# Patient Record
Sex: FEMALE | Race: BLACK OR AFRICAN AMERICAN | NOT HISPANIC OR LATINO | Employment: FULL TIME | URBAN - METROPOLITAN AREA
[De-identification: names, ages, dates, MRNs, and addresses within clinical notes are randomized per-mention and may not be internally consistent; named-entity substitution may affect disease eponyms.]

---

## 2017-12-04 ENCOUNTER — HOSPITAL ENCOUNTER (EMERGENCY)
Facility: OTHER | Age: 22
Discharge: HOME | End: 2017-12-04
Attending: EMERGENCY MEDICINE
Payer: MEDICAID

## 2017-12-04 VITALS
BODY MASS INDEX: 42.51 KG/M2 | TEMPERATURE: 99 F | OXYGEN SATURATION: 99 % | DIASTOLIC BLOOD PRESSURE: 60 MMHG | RESPIRATION RATE: 18 BRPM | HEIGHT: 62 IN | HEART RATE: 59 BPM | SYSTOLIC BLOOD PRESSURE: 108 MMHG | WEIGHT: 231 LBS

## 2017-12-04 DIAGNOSIS — R10.9 ABDOMINAL DISCOMFORT: Primary | ICD-10-CM

## 2017-12-04 DIAGNOSIS — R11.2 NON-INTRACTABLE VOMITING WITH NAUSEA, UNSPECIFIED VOMITING TYPE: ICD-10-CM

## 2017-12-04 LAB
B-HCG UR QL: NEGATIVE
BILIRUB UR QL STRIP: NEGATIVE
CLARITY UR: CLEAR
COLOR UR: YELLOW
CTP QC/QA: YES
GLUCOSE UR QL STRIP: NEGATIVE
HGB UR QL STRIP: NEGATIVE
KETONES UR QL STRIP: NEGATIVE
LEUKOCYTE ESTERASE UR QL STRIP: NEGATIVE
NITRITE UR QL STRIP: NEGATIVE
PH UR STRIP: 7 [PH] (ref 5–8)
PROT UR QL STRIP: NEGATIVE
SP GR UR STRIP: 1.01 (ref 1–1.03)
URN SPEC COLLECT METH UR: NORMAL
UROBILINOGEN UR STRIP-ACNC: NEGATIVE EU/DL

## 2017-12-04 PROCEDURE — 81003 URINALYSIS AUTO W/O SCOPE: CPT

## 2017-12-04 PROCEDURE — 99283 EMERGENCY DEPT VISIT LOW MDM: CPT | Mod: 25

## 2017-12-04 PROCEDURE — 25000003 PHARM REV CODE 250: Performed by: PHYSICIAN ASSISTANT

## 2017-12-04 PROCEDURE — 81025 URINE PREGNANCY TEST: CPT | Performed by: EMERGENCY MEDICINE

## 2017-12-04 RX ORDER — HYOSCYAMINE SULFATE 0.12 MG/1
0.12 TABLET SUBLINGUAL
Status: COMPLETED | OUTPATIENT
Start: 2017-12-04 | End: 2017-12-04

## 2017-12-04 RX ORDER — ONDANSETRON 4 MG/1
4 TABLET, FILM COATED ORAL EVERY 6 HOURS
Qty: 12 TABLET | Refills: 0 | Status: SHIPPED | OUTPATIENT
Start: 2017-12-04 | End: 2018-04-30

## 2017-12-04 RX ORDER — ONDANSETRON 8 MG/1
8 TABLET, ORALLY DISINTEGRATING ORAL
Status: COMPLETED | OUTPATIENT
Start: 2017-12-04 | End: 2017-12-04

## 2017-12-04 RX ORDER — DICYCLOMINE HYDROCHLORIDE 20 MG/1
20 TABLET ORAL 2 TIMES DAILY
Qty: 8 TABLET | Refills: 0 | Status: SHIPPED | OUTPATIENT
Start: 2017-12-04 | End: 2018-01-03

## 2017-12-04 RX ADMIN — ONDANSETRON 8 MG: 8 TABLET, ORALLY DISINTEGRATING ORAL at 02:12

## 2017-12-04 RX ADMIN — HYOSCYAMINE SULFATE 0.12 MG: 0.12 TABLET, ORALLY DISINTEGRATING ORAL at 02:12

## 2017-12-04 NOTE — ED TRIAGE NOTES
Pt presents with emesis, onset friday. Pt reports eating chinese food prior to emesis. Decreased appetite since Friday. pt reports typically throws up only in morning time.  + nausea, emesis this morning. Pt repots unable to keep down liquids or solids.  Pt denies diarrhea, last bowel movment today. + epigastric abdominal pain. + hx of GERD. Pt reports irregular periods due to birthcontrol implant, last period 11/25/17. Pt in NAD.

## 2017-12-04 NOTE — ED NOTES
NEURO: Pt AAO x 4. Behavior and speech appropriate to situation.   CARDIAC: Regular rhythm auscultated  RESPIRATORY: Respirations even and unlabored. Breath sounds clear to all lung fields.   MUSCULOSKELETAL: Active ROM noted to extremities  GASTRO: see hpi; + bowel sounds, epigastric abdominal tenderness

## 2017-12-04 NOTE — ED PROVIDER NOTES
Encounter Date: 12/4/2017       History     Chief Complaint   Patient presents with    Emesis     pt states thinks she ate something bad on friday - starting with abdominal pain and vomiting and has been vomiting ever since friday - has GERD and vomiting has made pain worse     Patient is a 22-year-old female with no significant past medical history who presents to the ED with emesis and abdominal pain. She states her pain began 3 days ago after eating Chinese food. She states she is unable to tolerate fluids or solids.  She also reports decreased appetite.  She denies diarrhea or blood in her stool.  She reports epigastric abdominal cramping. She states her last normal bowel movement was yesterday.  She denies fever or chills. She also reports a history of GERD and states this feels similar to that.  She states she is no longer taking a PPI. She denies any recent antibiotic use or travel.      The history is provided by the patient.     Review of patient's allergies indicates:  No Known Allergies  Past Medical History:   Diagnosis Date    Back pain     GERD (gastroesophageal reflux disease)     Obesity      Past Surgical History:   Procedure Laterality Date    LIVER BIOPSY       Family History   Problem Relation Age of Onset    Cancer Mother     Breast cancer Mother      Social History   Substance Use Topics    Smoking status: Current Every Day Smoker     Types: Cigars    Smokeless tobacco: Never Used      Comment: 1 black and mild daily     Alcohol use Yes      Comment: monthly     Review of Systems   Constitutional: Negative for chills and fever.   HENT: Negative for congestion and sore throat.    Eyes: Negative for pain.   Respiratory: Negative for shortness of breath.    Cardiovascular: Negative for chest pain.   Gastrointestinal: Positive for abdominal pain, nausea and vomiting. Negative for diarrhea.   Genitourinary: Negative for dysuria.   Musculoskeletal: Negative for arthralgias.   Skin: Negative  for rash.   Neurological: Negative for headaches.       Physical Exam     Initial Vitals [12/04/17 1405]   BP Pulse Resp Temp SpO2   124/74 85 18 97.9 °F (36.6 °C) 100 %      MAP       90.67         Physical Exam    Constitutional: Vital signs are normal. She is cooperative.   -American female in no acute distress.  She is nontoxic-appearing.   HENT:   Head: Normocephalic and atraumatic.   Moist mucous membranes   Eyes: Conjunctivae and EOM are normal. Pupils are equal, round, and reactive to light.   Neck: Normal range of motion. Neck supple.   Cardiovascular: Normal rate, regular rhythm and intact distal pulses.   Pulmonary/Chest: Breath sounds normal. She has no wheezes. She has no rhonchi. She has no rales.   Abdominal: Soft. Bowel sounds are normal.   Mild tenderness to palpation to epigastrium.  No rebound tenderness or guarding.  Negative Jennings sign.    Musculoskeletal: Normal range of motion. She exhibits no edema.   Neurological: She is alert and oriented to person, place, and time. She has normal strength. No cranial nerve deficit. GCS eye subscore is 4. GCS verbal subscore is 5. GCS motor subscore is 6.   Skin: Skin is warm and dry. Capillary refill takes less than 2 seconds. No rash noted.   Psychiatric: She has a normal mood and affect. Her behavior is normal.         ED Course   Procedures  Labs Reviewed   URINALYSIS   POCT URINE PREGNANCY             Medical Decision Making:   Initial Assessment:   Urgent evaluation of a 22 y.o. female with a medical history of GERD presenting to the emergency department complaining of nausea and vomiting and abdominal discomfort. Patient is afebrile, nontoxic appearing and hemodynamically stable.  ED Management:  The patient does not appear dehydrated, septic, toxic and I doubt this is bacterial in nature given that the patient has no bloody stools, recent antibiotics, foreign travel, raw foods. Vital signs are stable. The physical exam is not consistent with  acute abdomen, acute appendicitis or obstruction. Urinalysis revealed no ketones. Patient was given an antiemetic and passed an oral challenge without complications. Will send her home with Zofran and a short course of Bentyl. I have given specific return precautions. I have discussed the patient with the attending thoroughly and he/she agrees to the treatment and plan.   Other:   I have discussed this case with another health care provider.  This note was created using Dragon Medical Dictation. There may be typographical errors secondary to dictation.                    ED Course      Clinical Impression:     1. Abdominal discomfort    2. Non-intractable vomiting with nausea, unspecified vomiting type                               Richard Barillas PA-C  12/04/17 8227

## 2018-04-30 ENCOUNTER — HOSPITAL ENCOUNTER (EMERGENCY)
Facility: OTHER | Age: 23
Discharge: HOME | End: 2018-04-30
Attending: EMERGENCY MEDICINE
Payer: MEDICAID

## 2018-04-30 VITALS
TEMPERATURE: 99 F | OXYGEN SATURATION: 100 % | BODY MASS INDEX: 39.93 KG/M2 | DIASTOLIC BLOOD PRESSURE: 73 MMHG | HEART RATE: 86 BPM | SYSTOLIC BLOOD PRESSURE: 138 MMHG | HEIGHT: 62 IN | WEIGHT: 217 LBS | RESPIRATION RATE: 16 BRPM

## 2018-04-30 DIAGNOSIS — N30.00 ACUTE CYSTITIS WITHOUT HEMATURIA: Primary | ICD-10-CM

## 2018-04-30 LAB
B-HCG UR QL: NEGATIVE
BACTERIA #/AREA URNS HPF: ABNORMAL /HPF
BILIRUB UR QL STRIP: NEGATIVE
CLARITY UR: ABNORMAL
COLOR UR: ABNORMAL
CTP QC/QA: YES
GLUCOSE UR QL STRIP: NEGATIVE
HGB UR QL STRIP: ABNORMAL
HYALINE CASTS #/AREA URNS LPF: 0 /LPF
KETONES UR QL STRIP: NEGATIVE
LEUKOCYTE ESTERASE UR QL STRIP: ABNORMAL
MICROSCOPIC COMMENT: ABNORMAL
NITRITE UR QL STRIP: NEGATIVE
PH UR STRIP: 8.5 [PH] (ref 5–8)
PROT UR QL STRIP: ABNORMAL
RBC #/AREA URNS HPF: ABNORMAL /HPF (ref 0–4)
SP GR UR STRIP: 1.01 (ref 1–1.03)
SQUAMOUS #/AREA URNS HPF: 16 /HPF
URN SPEC COLLECT METH UR: ABNORMAL
UROBILINOGEN UR STRIP-ACNC: 1 EU/DL
WBC #/AREA URNS HPF: 7 /HPF (ref 0–5)
WBC CLUMPS URNS QL MICRO: ABNORMAL
YEAST URNS QL MICRO: ABNORMAL

## 2018-04-30 PROCEDURE — 99283 EMERGENCY DEPT VISIT LOW MDM: CPT | Mod: 25

## 2018-04-30 PROCEDURE — 81000 URINALYSIS NONAUTO W/SCOPE: CPT

## 2018-04-30 PROCEDURE — 81025 URINE PREGNANCY TEST: CPT | Performed by: EMERGENCY MEDICINE

## 2018-04-30 PROCEDURE — 25000003 PHARM REV CODE 250: Performed by: EMERGENCY MEDICINE

## 2018-04-30 RX ORDER — SULFAMETHOXAZOLE AND TRIMETHOPRIM 800; 160 MG/1; MG/1
1 TABLET ORAL 2 TIMES DAILY
Qty: 6 TABLET | Refills: 0 | Status: SHIPPED | OUTPATIENT
Start: 2018-04-30 | End: 2018-05-03

## 2018-04-30 RX ORDER — PHENAZOPYRIDINE HYDROCHLORIDE 200 MG/1
200 TABLET, FILM COATED ORAL
Qty: 6 TABLET | Refills: 0 | Status: SHIPPED | OUTPATIENT
Start: 2018-04-30 | End: 2018-05-03

## 2018-04-30 RX ORDER — PHENAZOPYRIDINE HYDROCHLORIDE 200 MG/1
200 TABLET, FILM COATED ORAL
Status: COMPLETED | OUTPATIENT
Start: 2018-04-30 | End: 2018-04-30

## 2018-04-30 RX ADMIN — PHENAZOPYRIDINE HYDROCHLORIDE 200 MG: 200 TABLET ORAL at 03:04

## 2018-04-30 NOTE — ED PROVIDER NOTES
Encounter Date: 4/30/2018    SCRIBE #1 NOTE: Kelsey GRUBBS, am scribing for, and in the presence of, Dr. Meeks.       History     Chief Complaint   Patient presents with    Abdominal Pain     lower abdominal pain x 2 days, worsening tonight at 2200, denies n/v     Time seen by provider: 2:49 AM    This is a 23 y.o. female who presents with complaint of abdominal pain that began one day ago. Patient reports lower abdominal pain and dysuria. She describes abdominal pain as sharp.She is currently on her menstrual period. She is currently taking medication for Bacterial vaginosis. She denies fever, chills, flank pain, hematuria, urinary urgency, urinary frequency, or vaginal discharge. She denies taking any OTC medications for symptoms prior to arrival. She reports no identifying, alleviating, or exasperating factors for symptoms.       The history is provided by the patient.     Review of patient's allergies indicates:  No Known Allergies  Past Medical History:   Diagnosis Date    Back pain     GERD (gastroesophageal reflux disease)     Obesity      Past Surgical History:   Procedure Laterality Date    LIVER BIOPSY       Family History   Problem Relation Age of Onset    Cancer Mother     Breast cancer Mother      Social History   Substance Use Topics    Smoking status: Current Every Day Smoker     Types: Cigars    Smokeless tobacco: Never Used      Comment: 1 black and mild daily     Alcohol use Yes      Comment: monthly     Review of Systems   Constitutional: Negative for chills and fever.   HENT: Negative for sore throat.    Respiratory: Negative for shortness of breath.    Cardiovascular: Negative for chest pain.   Gastrointestinal: Positive for abdominal pain. Negative for diarrhea, nausea and vomiting.   Genitourinary: Positive for dysuria. Negative for flank pain, frequency, hematuria, urgency and vaginal discharge.   Musculoskeletal: Negative for back pain.   Skin: Negative for rash.   Neurological:  Negative for weakness.   Hematological: Does not bruise/bleed easily.       Physical Exam     Initial Vitals [04/30/18 0244]   BP Pulse Resp Temp SpO2   123/81 92 16 98.9 °F (37.2 °C) 99 %      MAP       95         Physical Exam    Nursing note and vitals reviewed.  Constitutional: She appears well-developed and well-nourished. She is not diaphoretic. No distress.   HENT:   Head: Normocephalic and atraumatic.   Right Ear: External ear normal.   Left Ear: External ear normal.   Eyes: EOM are normal. Right eye exhibits no discharge. Left eye exhibits no discharge.   Neck: Normal range of motion.   Cardiovascular: Normal rate, regular rhythm and normal heart sounds. Exam reveals no gallop and no friction rub.    No murmur heard.  Pulmonary/Chest: Breath sounds normal. No respiratory distress. She has no wheezes. She has no rhonchi. She has no rales.   Abdominal: Soft. There is tenderness in the suprapubic area. There is no rebound, no guarding and no CVA tenderness.   Musculoskeletal: Normal range of motion. She exhibits no edema or tenderness.   Neurological: She is alert and oriented to person, place, and time.   Skin: Skin is warm and dry. No rash and no abscess noted. No erythema. No pallor.   Psychiatric: She has a normal mood and affect. Her behavior is normal. Judgment and thought content normal.         ED Course   Procedures  Labs Reviewed   URINALYSIS - Abnormal; Notable for the following:        Result Value    Appearance, UA Cloudy (*)     Protein, UA 1+ (*)     Occult Blood UA 3+ (*)     Leukocytes, UA 1+ (*)     All other components within normal limits   URINALYSIS MICROSCOPIC - Abnormal; Notable for the following:     WBC, UA 7 (*)     Bacteria, UA Few (*)     All other components within normal limits   POCT URINE PREGNANCY             Medical Decision Making:   Clinical Tests:   Lab Tests: Ordered and Reviewed    Additional MDM:   Comments: 23-year-old female presents complaining of over 24 hours of  suprapubic pain with low back pain tonight as well as dysuria.  Patient is currently being treated for bacterial vaginosis and is on her menstrual cycle.  On exam she had no CVA tenderness in the suprapubic tenderness to palpation.  Urine pregnancy test negative.  Urinalysis distant with hematuria, a few white blood cells and leukocytes.  Given her symptoms, she will be treated for acute cystitis with Bactrim ×3 days.  PCP follow-up for reevaluation after completion of antibiotics if symptoms persist..            Attending Attestation:           Physician Attestation for Scribe:  Physician Attestation Statement for Scribe #1: I, Dr. Meeks, reviewed documentation, as scribed by Kelsey Miguel in my presence, and it is both accurate and complete.                    Clinical Impression:     1. Acute cystitis without hematuria                                 Rosario Meeks MD  04/30/18 7486

## 2018-04-30 NOTE — ED TRIAGE NOTES
"Pt c/o " lower abdominal sharp pain since yesterday unrelieved by tylenol. " pt c/o dysuria and right lower back pain  but denies fever, chills, sob, cp, n/v. Pt is currently taking flagyl for recent bladder infection  "

## 2018-07-11 ENCOUNTER — HOSPITAL ENCOUNTER (EMERGENCY)
Facility: OTHER | Age: 23
Discharge: HOME | End: 2018-07-11
Attending: EMERGENCY MEDICINE
Payer: MEDICAID

## 2018-07-11 VITALS
WEIGHT: 199.75 LBS | HEIGHT: 62 IN | BODY MASS INDEX: 36.76 KG/M2 | DIASTOLIC BLOOD PRESSURE: 68 MMHG | OXYGEN SATURATION: 99 % | TEMPERATURE: 98 F | HEART RATE: 76 BPM | SYSTOLIC BLOOD PRESSURE: 118 MMHG | RESPIRATION RATE: 18 BRPM

## 2018-07-11 DIAGNOSIS — Z20.2 STD EXPOSURE: ICD-10-CM

## 2018-07-11 DIAGNOSIS — N76.0 BV (BACTERIAL VAGINOSIS): Primary | ICD-10-CM

## 2018-07-11 DIAGNOSIS — B96.89 BV (BACTERIAL VAGINOSIS): Primary | ICD-10-CM

## 2018-07-11 LAB
B-HCG UR QL: NEGATIVE
BACTERIA GENITAL QL WET PREP: ABNORMAL
BILIRUB UR QL STRIP: NEGATIVE
CLARITY UR: CLEAR
CLUE CELLS VAG QL WET PREP: ABNORMAL
COLOR UR: YELLOW
CTP QC/QA: YES
FILAMENT FUNGI VAG WET PREP-#/AREA: ABNORMAL
GLUCOSE UR QL STRIP: NEGATIVE
HGB UR QL STRIP: NEGATIVE
KETONES UR QL STRIP: NEGATIVE
LEUKOCYTE ESTERASE UR QL STRIP: NEGATIVE
NITRITE UR QL STRIP: NEGATIVE
PH UR STRIP: 7 [PH] (ref 5–8)
PROT UR QL STRIP: NEGATIVE
SP GR UR STRIP: 1.01 (ref 1–1.03)
SPECIMEN SOURCE: ABNORMAL
T VAGINALIS GENITAL QL WET PREP: ABNORMAL
URN SPEC COLLECT METH UR: NORMAL
UROBILINOGEN UR STRIP-ACNC: NEGATIVE EU/DL
WBC #/AREA VAG WET PREP: ABNORMAL
YEAST GENITAL QL WET PREP: ABNORMAL

## 2018-07-11 PROCEDURE — 81003 URINALYSIS AUTO W/O SCOPE: CPT

## 2018-07-11 PROCEDURE — 99283 EMERGENCY DEPT VISIT LOW MDM: CPT | Mod: 25

## 2018-07-11 PROCEDURE — 96372 THER/PROPH/DIAG INJ SC/IM: CPT

## 2018-07-11 PROCEDURE — 81025 URINE PREGNANCY TEST: CPT | Performed by: EMERGENCY MEDICINE

## 2018-07-11 PROCEDURE — 63600175 PHARM REV CODE 636 W HCPCS: Performed by: PHYSICIAN ASSISTANT

## 2018-07-11 PROCEDURE — 25000003 PHARM REV CODE 250: Performed by: PHYSICIAN ASSISTANT

## 2018-07-11 PROCEDURE — 87210 SMEAR WET MOUNT SALINE/INK: CPT

## 2018-07-11 PROCEDURE — 87491 CHLMYD TRACH DNA AMP PROBE: CPT

## 2018-07-11 RX ORDER — METRONIDAZOLE 500 MG/1
500 TABLET ORAL EVERY 12 HOURS
Qty: 14 TABLET | Refills: 0 | Status: SHIPPED | OUTPATIENT
Start: 2018-07-11 | End: 2018-07-18

## 2018-07-11 RX ORDER — CEFTRIAXONE 250 MG/1
250 INJECTION, POWDER, FOR SOLUTION INTRAMUSCULAR; INTRAVENOUS
Status: COMPLETED | OUTPATIENT
Start: 2018-07-11 | End: 2018-07-11

## 2018-07-11 RX ORDER — AZITHROMYCIN 250 MG/1
1000 TABLET, FILM COATED ORAL
Status: COMPLETED | OUTPATIENT
Start: 2018-07-11 | End: 2018-07-11

## 2018-07-11 RX ADMIN — AZITHROMYCIN 1000 MG: 250 TABLET, FILM COATED ORAL at 02:07

## 2018-07-11 RX ADMIN — CEFTRIAXONE SODIUM 250 MG: 250 INJECTION, POWDER, FOR SOLUTION INTRAMUSCULAR; INTRAVENOUS at 02:07

## 2018-07-11 NOTE — ED PROVIDER NOTES
Encounter Date: 7/11/2018       History     Chief Complaint   Patient presents with    Exposure to STD     states she has had discharge x 1 wk. reports she has fishy smell and gonorrhea     Patient is 23 year old female who presents with complaints of vaginal discharge and fishy vaginal odor as in present for 1 week.  She reports she was recently diagnosed and treated for gonorrhea.  She admits her symptoms resolved but she continued to have a sex with her boyfriend who was not tested or treated.  She reports her symptoms have now returned.  She reports no nausea, vomiting, abdominal pain, irregular bleeding, dysuria, hematuria or abnormal bowel movements.  She is currently alone in exam room.           Review of patient's allergies indicates:  No Known Allergies  Past Medical History:   Diagnosis Date    Back pain     GERD (gastroesophageal reflux disease)     Obesity      Past Surgical History:   Procedure Laterality Date    LIVER BIOPSY       Family History   Problem Relation Age of Onset    Cancer Mother     Breast cancer Mother      Social History   Substance Use Topics    Smoking status: Current Every Day Smoker     Types: Cigars    Smokeless tobacco: Never Used      Comment: 1 black and mild daily     Alcohol use Yes      Comment: monthly     Review of Systems   Constitutional: Negative for fever.   HENT: Negative for sore throat.    Respiratory: Negative for shortness of breath.    Cardiovascular: Negative for chest pain.   Gastrointestinal: Negative for nausea.   Genitourinary: Positive for vaginal discharge. Negative for dysuria.   Musculoskeletal: Negative for back pain.   Skin: Negative for rash.   Neurological: Negative for weakness.   Hematological: Does not bruise/bleed easily.       Physical Exam     Initial Vitals [07/11/18 1319]   BP Pulse Resp Temp SpO2   102/61 62 18 98 °F (36.7 °C) 100 %      MAP       --         Physical Exam    Nursing note and vitals reviewed.  Constitutional: She  appears well-developed and well-nourished. She is not diaphoretic. No distress.   Healthy appearing female in NAD or apparent pain. She makes good eye contact, speaks in clear full sentences and ambulates with ease.    HENT:   Head: Normocephalic and atraumatic.   Eyes: Conjunctivae and EOM are normal. Pupils are equal, round, and reactive to light. Right eye exhibits no discharge. Left eye exhibits no discharge. No scleral icterus.   Neck: Normal range of motion.   Cardiovascular: Normal rate, regular rhythm, normal heart sounds and intact distal pulses. Exam reveals no gallop and no friction rub.    No murmur heard.  Pulmonary/Chest: Breath sounds normal. She has no wheezes. She has no rhonchi. She has no rales.   Abdominal: Soft. Bowel sounds are normal. There is no tenderness. There is no rebound and no guarding.   Genitourinary:   Genitourinary Comments: Thin whit discharge in vault without bleeding. There is no cervical abnormalities. There is no CMT, uterine or adnexal TTP bimanually.    Musculoskeletal: Normal range of motion. She exhibits no edema or tenderness.   Lymphadenopathy:     She has no cervical adenopathy.   Neurological: She is alert and oriented to person, place, and time. She has normal strength. No cranial nerve deficit or sensory deficit.   Skin: Skin is warm. Capillary refill takes less than 2 seconds. No rash and no abscess noted. No erythema.   Psychiatric: She has a normal mood and affect. Thought content normal.         ED Course   Procedures  Labs Reviewed   C. TRACHOMATIS/N. GONORRHOEAE BY AMP DNA   URINALYSIS   VAGINAL SCREEN   POCT URINE PREGNANCY         Labs Reviewed   VAGINAL SCREEN - Abnormal; Notable for the following:        Result Value    Clue Cells, Wet Prep Rare (*)     Bacteria - Vaginal Screen Moderate (*)     All other components within normal limits   C. TRACHOMATIS/N. GONORRHOEAE BY AMP DNA   URINALYSIS   POCT URINE PREGNANCY            Medical Decision Making:   ED  Management:  Urgent evaluation of 23 year old female who presents with complaints of vaginal discharge found to have BV. She is afebrile, non-toxic appearing and hemodynamically stable. Physical exam outlined above and reveals evidence of bacterial vaginosis on vaginal screen.  No clinical evidence of significant gonorrhea or chlamydial infection however given her history will treat empirically according to patient's request.  Will also cover with Flagyl and encourage follow-up with OBGYN for test of cure.  She is adamantly instructed to stop having sex until her symptoms have resolved, she has been tested for cure, and her partner has been tested as well. She verbalized understanding and is amenable to plan. Case discussed with attending MD who agrees with plan.   Other:   I have discussed this case with another health care provider.                      Clinical Impression:   The primary encounter diagnosis was BV (bacterial vaginosis). A diagnosis of STD exposure was also pertinent to this visit.                             Faby Miguel PA-C  07/11/18 2316

## 2018-07-12 LAB
C TRACH DNA SPEC QL NAA+PROBE: NOT DETECTED
N GONORRHOEA DNA SPEC QL NAA+PROBE: NOT DETECTED

## 2019-11-20 ENCOUNTER — HOSPITAL ENCOUNTER (EMERGENCY)
Facility: HOSPITAL | Age: 24
Discharge: HOME | End: 2019-11-20
Attending: EMERGENCY MEDICINE
Payer: MEDICAID

## 2019-11-20 VITALS
HEART RATE: 57 BPM | BODY MASS INDEX: 34.39 KG/M2 | TEMPERATURE: 98 F | HEIGHT: 66 IN | DIASTOLIC BLOOD PRESSURE: 67 MMHG | WEIGHT: 214 LBS | OXYGEN SATURATION: 100 % | RESPIRATION RATE: 16 BRPM | SYSTOLIC BLOOD PRESSURE: 119 MMHG

## 2019-11-20 DIAGNOSIS — R53.1 WEAKNESS: ICD-10-CM

## 2019-11-20 DIAGNOSIS — F32.A DEPRESSION, UNSPECIFIED DEPRESSION TYPE: Primary | ICD-10-CM

## 2019-11-20 PROBLEM — F12.10 CANNABIS ABUSE, DAILY USE: Status: ACTIVE | Noted: 2019-11-20

## 2019-11-20 PROBLEM — F33.2 SEVERE EPISODE OF RECURRENT MAJOR DEPRESSIVE DISORDER, WITHOUT PSYCHOTIC FEATURES: Status: ACTIVE | Noted: 2019-11-20

## 2019-11-20 LAB
ALBUMIN SERPL BCP-MCNC: 4.2 G/DL (ref 3.5–5.2)
ALP SERPL-CCNC: 48 U/L (ref 55–135)
ALT SERPL W/O P-5'-P-CCNC: 11 U/L (ref 10–44)
AMPHET+METHAMPHET UR QL: NEGATIVE
ANION GAP SERPL CALC-SCNC: 10 MMOL/L (ref 8–16)
APAP SERPL-MCNC: <3 UG/ML (ref 10–20)
AST SERPL-CCNC: 16 U/L (ref 10–40)
B-HCG UR QL: NEGATIVE
BACTERIA #/AREA URNS AUTO: ABNORMAL /HPF
BARBITURATES UR QL SCN>200 NG/ML: NEGATIVE
BASOPHILS # BLD AUTO: 0.01 K/UL (ref 0–0.2)
BASOPHILS NFR BLD: 0.3 % (ref 0–1.9)
BENZODIAZ UR QL SCN>200 NG/ML: NEGATIVE
BILIRUB SERPL-MCNC: 0.7 MG/DL (ref 0.1–1)
BILIRUB UR QL STRIP: NEGATIVE
BUN SERPL-MCNC: 7 MG/DL (ref 6–20)
BZE UR QL SCN: NEGATIVE
CALCIUM SERPL-MCNC: 9.1 MG/DL (ref 8.7–10.5)
CANNABINOIDS UR QL SCN: NORMAL
CHLORIDE SERPL-SCNC: 108 MMOL/L (ref 95–110)
CLARITY UR REFRACT.AUTO: ABNORMAL
CO2 SERPL-SCNC: 22 MMOL/L (ref 23–29)
COLOR UR AUTO: YELLOW
CREAT SERPL-MCNC: 0.8 MG/DL (ref 0.5–1.4)
CREAT UR-MCNC: 296 MG/DL (ref 15–325)
CTP QC/QA: YES
CTP QC/QA: YES
DIFFERENTIAL METHOD: ABNORMAL
EOSINOPHIL # BLD AUTO: 0 K/UL (ref 0–0.5)
EOSINOPHIL NFR BLD: 0.8 % (ref 0–8)
ERYTHROCYTE [DISTWIDTH] IN BLOOD BY AUTOMATED COUNT: 12.1 % (ref 11.5–14.5)
EST. GFR  (AFRICAN AMERICAN): >60 ML/MIN/1.73 M^2
EST. GFR  (NON AFRICAN AMERICAN): >60 ML/MIN/1.73 M^2
ETHANOL SERPL-MCNC: <10 MG/DL
GLUCOSE SERPL-MCNC: 76 MG/DL (ref 70–110)
GLUCOSE UR QL STRIP: NEGATIVE
HCT VFR BLD AUTO: 41.7 % (ref 37–48.5)
HGB BLD-MCNC: 13.7 G/DL (ref 12–16)
HGB UR QL STRIP: NEGATIVE
IMM GRANULOCYTES # BLD AUTO: 0.01 K/UL (ref 0–0.04)
IMM GRANULOCYTES NFR BLD AUTO: 0.3 % (ref 0–0.5)
KETONES UR QL STRIP: ABNORMAL
LEUKOCYTE ESTERASE UR QL STRIP: NEGATIVE
LYMPHOCYTES # BLD AUTO: 2.1 K/UL (ref 1–4.8)
LYMPHOCYTES NFR BLD: 54.1 % (ref 18–48)
MCH RBC QN AUTO: 30.1 PG (ref 27–31)
MCHC RBC AUTO-ENTMCNC: 32.9 G/DL (ref 32–36)
MCV RBC AUTO: 92 FL (ref 82–98)
METHADONE UR QL SCN>300 NG/ML: NEGATIVE
MICROSCOPIC COMMENT: ABNORMAL
MONOCYTES # BLD AUTO: 0.4 K/UL (ref 0.3–1)
MONOCYTES NFR BLD: 10.2 % (ref 4–15)
NEUTROPHILS # BLD AUTO: 1.4 K/UL (ref 1.8–7.7)
NEUTROPHILS NFR BLD: 34.3 % (ref 38–73)
NITRITE UR QL STRIP: NEGATIVE
NRBC BLD-RTO: 0 /100 WBC
OPIATES UR QL SCN: NEGATIVE
PCP UR QL SCN>25 NG/ML: NEGATIVE
PH UR STRIP: 6 [PH] (ref 5–8)
PLATELET # BLD AUTO: 174 K/UL (ref 150–350)
PMV BLD AUTO: 9.6 FL (ref 9.2–12.9)
POC MOLECULAR INFLUENZA A AGN: NEGATIVE
POC MOLECULAR INFLUENZA B AGN: NEGATIVE
POTASSIUM SERPL-SCNC: 4 MMOL/L (ref 3.5–5.1)
PROT SERPL-MCNC: 7.2 G/DL (ref 6–8.4)
PROT UR QL STRIP: NEGATIVE
RBC # BLD AUTO: 4.55 M/UL (ref 4–5.4)
RBC #/AREA URNS AUTO: 5 /HPF (ref 0–4)
SODIUM SERPL-SCNC: 140 MMOL/L (ref 136–145)
SP GR UR STRIP: 1.02 (ref 1–1.03)
SQUAMOUS #/AREA URNS AUTO: 21 /HPF
TOXICOLOGY INFORMATION: NORMAL
TSH SERPL DL<=0.005 MIU/L-ACNC: 0.43 UIU/ML (ref 0.4–4)
URN SPEC COLLECT METH UR: ABNORMAL
WBC # BLD AUTO: 3.94 K/UL (ref 3.9–12.7)
WBC #/AREA URNS AUTO: 3 /HPF (ref 0–5)

## 2019-11-20 PROCEDURE — 99285 EMERGENCY DEPT VISIT HI MDM: CPT | Mod: ,,, | Performed by: EMERGENCY MEDICINE

## 2019-11-20 PROCEDURE — 63600175 PHARM REV CODE 636 W HCPCS: Performed by: EMERGENCY MEDICINE

## 2019-11-20 PROCEDURE — 80307 DRUG TEST PRSMV CHEM ANLYZR: CPT

## 2019-11-20 PROCEDURE — 80053 COMPREHEN METABOLIC PANEL: CPT

## 2019-11-20 PROCEDURE — 93005 ELECTROCARDIOGRAM TRACING: CPT

## 2019-11-20 PROCEDURE — 87502 INFLUENZA DNA AMP PROBE: CPT

## 2019-11-20 PROCEDURE — 99285 PR EMERGENCY DEPT VISIT,LEVEL V: ICD-10-PCS | Mod: ,,, | Performed by: EMERGENCY MEDICINE

## 2019-11-20 PROCEDURE — 85025 COMPLETE CBC W/AUTO DIFF WBC: CPT

## 2019-11-20 PROCEDURE — 93010 EKG 12-LEAD: ICD-10-PCS | Mod: ,,, | Performed by: INTERNAL MEDICINE

## 2019-11-20 PROCEDURE — 80320 DRUG SCREEN QUANTALCOHOLS: CPT

## 2019-11-20 PROCEDURE — 81001 URINALYSIS AUTO W/SCOPE: CPT

## 2019-11-20 PROCEDURE — 84443 ASSAY THYROID STIM HORMONE: CPT

## 2019-11-20 PROCEDURE — 80329 ANALGESICS NON-OPIOID 1 OR 2: CPT

## 2019-11-20 PROCEDURE — 96361 HYDRATE IV INFUSION ADD-ON: CPT

## 2019-11-20 PROCEDURE — 81025 URINE PREGNANCY TEST: CPT | Performed by: EMERGENCY MEDICINE

## 2019-11-20 PROCEDURE — 99284 EMERGENCY DEPT VISIT MOD MDM: CPT | Mod: 25

## 2019-11-20 PROCEDURE — 93010 ELECTROCARDIOGRAM REPORT: CPT | Mod: ,,, | Performed by: INTERNAL MEDICINE

## 2019-11-20 PROCEDURE — 96374 THER/PROPH/DIAG INJ IV PUSH: CPT

## 2019-11-20 RX ORDER — ESCITALOPRAM OXALATE 10 MG/1
10 TABLET ORAL DAILY
Qty: 30 TABLET | Refills: 0 | Status: SHIPPED | OUTPATIENT
Start: 2019-11-20 | End: 2020-11-19

## 2019-11-20 RX ORDER — ONDANSETRON 2 MG/ML
4 INJECTION INTRAMUSCULAR; INTRAVENOUS
Status: COMPLETED | OUTPATIENT
Start: 2019-11-20 | End: 2019-11-20

## 2019-11-20 RX ADMIN — ONDANSETRON 4 MG: 2 INJECTION INTRAMUSCULAR; INTRAVENOUS at 11:11

## 2019-11-20 RX ADMIN — SODIUM CHLORIDE 1000 ML: 0.9 INJECTION, SOLUTION INTRAVENOUS at 11:11

## 2019-11-20 NOTE — ASSESSMENT & PLAN NOTE
A:  Dx as above, vs SIMD. Patient with alarming reports of recent hopelessness and passive SI, but denies any occurrence of active SI or organized plan. No current SI. Patient with no signs or symptoms of thought disorder. Occasional alcohol use in social setting and no guns at home. Collateral very supportive of discharge. Discussed possibility of inpatient treatment with patient, who ultimately felt that an IOP and/or outpatient psychiatric management would be more appropriate.     P:  - Patient does not meet PEC criteria at this time.  - discharge to care of cousin/father  - Patient believes she has medical insurance through [facility]  - recommend alternate SSRI to celexa if restarted (consider lexapro 5 mg po x3 days then 10 mg PO daily afterwards) due to reported abdo distress as side effect. Recommend avoid zoloft due to likelihood for abdominal distress.  - recommend ambulatory referral to [facility] outpatient psychiatry (and [facility] if possible)  - psych MD placed resources in AVS, including [facility] and [facility] outpatient psych phone number at top of list (these were discussed with patient)  - patient advised to refrain from drugs/alcohol, notably daily THC  - Patient and collateral advised to return to ER or call 911 should patient or collateral believe patient is at danger of harming self or others.

## 2019-11-20 NOTE — ED PROVIDER NOTES
"Encounter Date: 11/20/2019    SCRIBE #1 NOTE: I, Gisela Mcdonald, am scribing for, and in the presence of,  Dr. Lujan. I have scribed the entire note.       History     Chief Complaint   Patient presents with    URI     Pt reports her child has been sick and she has had a cough for a couple days. Also complaining of nausea. Denies vomiting. Pt also reports diahhrea last week.      Time patient was seen by the provider: 9:56 AM      The patient is a 24 y.o. female with co-morbidities including: GERD, anxiety, post partum depression, who presents to the ED with a complaint of URI symptoms for 5 days. The patient reports her child has been sick since last week with a fever and infection. She notes of associated symptoms of cough, headaches, fever, chills and also nausea. She states she has been feeling very nauseas lately but has been unable to vomit. Patient mentions she has been very depressed lately due to many personal problems. This is around the time of her mother's anniversary passing away 6 years ago. She also broke up with her child's father. She admits vague thoughts of harming herself at times. She states, "Sometimes I feel I just want to lay down to die". She has not been eating or drinking much lately. She states she has a history of post partum depression. Denies having any active suicidal thoughts at this time.     The history is provided by the patient and medical records.     Review of patient's allergies indicates:  No Known Allergies  Past Medical History:   Diagnosis Date    Back pain     GERD (gastroesophageal reflux disease)     Obesity      Past Surgical History:   Procedure Laterality Date    LIVER BIOPSY       Family History   Problem Relation Age of Onset    Cancer Mother     Breast cancer Mother      Social History     Tobacco Use    Smoking status: Current Every Day Smoker     Types: Cigars    Smokeless tobacco: Never Used    Tobacco comment: 1 black and mild daily    Substance " Use Topics    Alcohol use: Yes     Comment: monthly    Drug use: Yes     Types: Marijuana     Comment: once monthly      Review of Systems   Constitutional: Positive for appetite change (decreased), chills and fever.   HENT: Negative for sore throat.    Respiratory: Positive for cough. Negative for shortness of breath.    Cardiovascular: Negative for chest pain.   Gastrointestinal: Positive for nausea. Negative for vomiting.   Genitourinary: Negative for dysuria.   Musculoskeletal: Negative for back pain.   Skin: Negative for rash.   Neurological: Positive for headaches. Negative for weakness.   Hematological: Does not bruise/bleed easily.       Physical Exam     Initial Vitals [11/20/19 0923]   BP Pulse Resp Temp SpO2   123/64 68 18 97.7 °F (36.5 °C) 99 %      MAP       --         Physical Exam    Nursing note and vitals reviewed.  Constitutional: She appears well-developed and well-nourished. She is not diaphoretic. No distress.   Appears dehydrated   HENT:   Head: Normocephalic and atraumatic.   Mouth/Throat: Oropharynx is clear and moist.   Eyes: Conjunctivae and EOM are normal. Pupils are equal, round, and reactive to light.   Neck: Normal range of motion. Neck supple. No JVD present.   Cardiovascular: Normal rate, regular rhythm and normal heart sounds.   No murmur heard.  Pulmonary/Chest: Breath sounds normal. No respiratory distress. She has no wheezes. She has no rhonchi. She has no rales.   Abdominal: Soft. Bowel sounds are normal. She exhibits no distension and no mass. There is no tenderness. There is no rebound and no guarding.   Musculoskeletal: Normal range of motion. She exhibits no edema or tenderness.   Neurological: She is alert and oriented to person, place, and time. She has normal strength. No cranial nerve deficit or sensory deficit.   Skin: Skin is warm and dry. No rash noted.   Psychiatric: Thought content normal.   Depressed affect         ED Course   Procedures  Labs Reviewed   CBC W/  AUTO DIFFERENTIAL - Abnormal; Notable for the following components:       Result Value    Gran # (ANC) 1.4 (*)     Gran% 34.3 (*)     Lymph% 54.1 (*)     All other components within normal limits   COMPREHENSIVE METABOLIC PANEL - Abnormal; Notable for the following components:    CO2 22 (*)     Alkaline Phosphatase 48 (*)     All other components within normal limits   URINALYSIS, REFLEX TO URINE CULTURE - Abnormal; Notable for the following components:    Appearance, UA Cloudy (*)     Ketones, UA 3+ (*)     All other components within normal limits    Narrative:     Preferred Collection Type->Urine, Clean Catch   ACETAMINOPHEN LEVEL - Abnormal; Notable for the following components:    Acetaminophen (Tylenol), Serum <3.0 (*)     All other components within normal limits   URINALYSIS MICROSCOPIC - Abnormal; Notable for the following components:    RBC, UA 5 (*)     All other components within normal limits    Narrative:     Preferred Collection Type->Urine, Clean Catch   TSH   DRUG SCREEN PANEL, URINE EMERGENCY    Narrative:     Preferred Collection Type->Urine, Clean Catch   ALCOHOL,MEDICAL (ETHANOL)   POCT INFLUENZA A/B MOLECULAR   POCT URINE PREGNANCY     EKG Readings: (Independently Interpreted)   Initial Reading: No STEMI. Rhythm: Normal Sinus Rhythm. Heart Rate: 65.   No ischemic changes.  QTC at 424     ECG Results          EKG 12-lead (Final result)  Result time 11/20/19 11:29:40    Final result by Interface, Lab In [facility] (11/20/19 11:29:40)                 Narrative:    Test Reason : R53.1,    Vent. Rate : 065 BPM     Atrial Rate : 065 BPM     P-R Int : 140 ms          QRS Dur : 088 ms      QT Int : 408 ms       P-R-T Axes : 059 061 060 degrees     QTc Int : 424 ms    Normal sinus rhythm with sinus arrhythmia  Normal ECG  No previous ECGs available  Confirmed by DARRION BATISTA MD ([id number]) on 11/20/2019 11:29:31 AM    Referred By: [email or web address]   SELF           Confirmed By:DARRION BATISTA MD                             Imaging Results    None          Medical Decision Making:   History:   Old Medical Records: I decided to obtain old medical records.  Initial Assessment:   Patient with viral symptoms. Will do medical evaluation. She does appear a little bit dehydrated. Will give IV fluids. Patient with depression. Vague suicidal ideations, no active suicidal thoughts. Discussed case with psychiatry and felt that patient does not need to be PEC and feels he should come down for evaluation. Will keep her here voluntarily. I discussed this with charge nurse and nursing.   Independently Interpreted Test(s):   I have ordered and independently interpreted EKG Reading(s) - see prior notes  Clinical Tests:   Lab Tests: Ordered and Reviewed  Medical Tests: Ordered and Reviewed  ED Management:  1:29 PM  Seen by psychiatry, recommend discharge and follow up with psychiatry here.     1:32 PM  Went over discharge with her. Her father is here and is comfortable of her being released.   Other:   I have discussed this case with another health care provider.       <> Summary of the Discussion: Psychiatry            Scribe Attestation:   Scribe #1: I performed the above scribed service and the documentation accurately describes the services I performed. I attest to the accuracy of the note.                          Clinical Impression:       ICD-10-CM ICD-9-CM   1. Depression, unspecified depression type F32.9 311   2. Weakness R53.1 780.79         Disposition:   Disposition: Discharged  Condition: Stable                     León Lujan MD  11/20/19 1919

## 2019-11-20 NOTE — ED TRIAGE NOTES
Patient reports she has been having a URI, she is depressed for the last 3 weeks.  Patient has no plane but does wish she could go to sleep and not wake up.

## 2019-11-20 NOTE — DISCHARGE INSTRUCTIONS
BMU program (Intensive Outpatient Program) at [facility] - this program was discussed with patient who voiced understanding , advised would be in discharge paperwork  To enroll in the Behavioral Medicine Program, call [phone] or call the Department of Psychiatry at [phone] or toll-free at [phone] and ask for the Behavioral Medicine Unit.    - [facility]: Ab House 4th floor, [phone]  ============================    [place] Supported Outpatient Clinics  - [facility]: [address] [facility], [phone]  - [place] [facility]: [address] [facility], [phone]  - [facility]: [address], [facility], [phone]  - [facility]: [address] [facility], [phone]  - [facility]): [address], [phone]  - [facility] ([facility] [address] [facility] Danika [place]ice[place], [phone]  - Salma Avila [facility]): [phone]  - [facility] [phone]     U and Private Outpatient Clinics    - [facility]: Ab House 4th floor, [phone]  - [facility]: [address] [facility] [address] 3rd Floor)[facility], [phone]  - [facility]: [address] [facility], [phone], [phone]  - [facility], [facility]: [address], [phone]    Outpatient Group Therapy  - Cancer Support Group: [facility], [address] [facility], Stefan Malone, [phone]  - Depression/Bipolar Support [place]: [facility], [address], 7:30pm 1st & 3rd Tuesdays in cafeteria, [phone]  - Heart Transplant Support Group: [facility], 3rd Wednesday, 7th floor conference room, Kellee Robles, [phone]  - [facility] ([facility]): [address] [place] [address], [place], [phone]  - [facility]: [facility] 458,  Katrin Mcgrath, [phone]    Outpatient Drug Rehab   - [facility]: Ab House, 4th Floor, Augusto Shweta, [phone]  - Alcoholics Anonymous: [facility].org, 24 Hr Hotline:238.558.9976, Main: [phone]  - [place] [facility]: [address] [facility], [phone],  at 10am, Reece Buchanan (ext. [phone])  - [facility]: [address], [phone]  - [place] [facility]: [address] [facility] Danika Lice[address], [phone]  - [facility] [facility]: [address], [phone]    Inpatient Drug Rehab  - [facility]: [address] [facility], [phone]   - [facility]: [address] [facility], [phone]   - [facility]: [address], [place], [phone]  - [facility]: [phone]  - [facility] <females only> ([employer] Agency): [address] [facility], [phone], ext [phone], Stefanie Lucho  - [facility] <Fee based>: [address], [facility], [phone]    Elderly Services:  - Adult Protective Services: [phone]  - Bereavement Support Group, [facility],  &  Tues, [address] [facility], [phone],  Vitaliy Beck, Ms Federico, therapists  - Case Management for [facility]- [address] [facility], [place] [address] - Call [phone]   - [facility] on [address], [phone]  - [facility] -. [address] [place], [place] [address] [phone] ext [phone]   - [facility]:. - [address] [facility], [address], [place] [address] - Call [phone]   - [facility] on Aging: Information on Aging Services - [address] [facility], [address], [place] [address] - call [phone]     Alzheimers Services:   - [facility]'s [facility] : - [facility] [place], [place] [address] - Call [phone]   - Alzheimer's Adult care at the [facility] and  [place], [place]. -  call [phone].  - Alzheimer's Services [facility]: Provides current information on services available. Call [phone]     Mental Retardation Services:   - [facility] Retarded [facility]- Call [phone]     Female Services:  - Battered Womens Hotline: [phone],   - [facility]attered [facility]om[facility]: [address] [address], [place], [address], Tele: [phone]  - Rape Hotline: 1-289.731.4097,     Child Outpatient Psychiatry  - [facility]: [address] [place] [address], [address], [phone]  - [facility]: [address], [phone], [phone]    Placement/Shelters:    - [facility] Services: - [address] [place], [place] [address] - Call [phone]   - [facility]  Medicaid funded, call [phone]   - [facility]: family transitional housing, [address] [facility], call for intake appt, [phone],   - [facility], Outreach and Housing Placement, [address] [place] [address],  Sister Radha Rico MSW, [phone]  - [facility]: [address] [facility], [phone]  - [facility] (16-24 year old): [address], [phone]  - [facility]: [address] [facility]  [phone]  - [facility], [address] [facility] [phone]  - [facility] (women only): [address], [phone]    HIV/AIDS Placement:  - AIDS Housing / Shelter: [address], [address], [place], [place] [address] - call: [phone] or [phone]  - [facility], Call [phone]   - [facility] [phone]    Health Clinics / Dental Clinics / Indigent Pharmacy  - [facility]: [address] [facility] [address], [phone]  - [facility]: [address] [facility], [phone], Mon-Thur 9am-4pm, Fri 9am-12pm  - [facility]: [address] [facility], [phone]  - [facility]: [address] [facility] [facility] [address],  [phone], fax [phone]  - [facility] (dental): [address] [place] [address]  [phone]  - [facility] Office of [facility], [address] [facility], [phone]  - [facility] (dental): [address], [place], [phone]  - [facility] (Pharmacy): [address], ([facility]), [phone], Mon/Wed 8am-1pm    s Offices:  - [facility] s Office: Dr. Barron, [phone], [phone]  - [facility] s Office: Dr Lester Morrison: [phone]    Crisis  - [facility] Center Hotline, [phone]  - National Suicide Prevention Crisis Hotline: 881.206.6440
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
## 2019-11-20 NOTE — SUBJECTIVE & OBJECTIVE
Patient History           Medical as of 11/20/2019     Past Medical History     Diagnosis Date Comments Source    Back pain -- -- Provider    GERD (gastroesophageal reflux disease) -- -- Provider    Obesity -- -- Provider                  Surgical as of 11/20/2019     Past Surgical History     Procedure Laterality Date Comments Source    LIVER BIOPSY -- -- -- Provider                  Family as of 11/20/2019     Problem Relation Name Age of Onset Comments Source    Cancer Mother -- -- -- Provider    Breast cancer Mother -- -- -- Provider            Tobacco Use as of 11/20/2019     Smoking Status Smoking Start Date Smoking Quit Date Packs/Day Years Used    Current Every Day Smoker -- -- -- --    Types Comments Smokeless Tobacco Status Smokeless Tobacco Quit Date Source     Cigars 1 black and mild daily  Never Used -- Provider            Alcohol Use as of 11/20/2019     Alcohol Use Drinks/Week Alcohol/Week Comments Source    Yes -- -- monthly Provider    Frequency Standard Drinks Binge Drinking        -- -- --              Drug Use as of 11/20/2019     Drug Use Types Frequency Comments Source    Yes  Marijuana -- once monthly  Provider            Sexual Activity as of 11/20/2019     Sexually Active Birth Control Partners Comments Source    -- -- -- -- Provider            Activities of Daily Living as of 11/20/2019    None           Social Documentation as of 11/20/2019    None           Occupational as of 11/20/2019    None           Socioeconomic as of 11/20/2019     Marital Status Spouse Name Number of Children Years Education Education Level Preferred Language Ethnicity Race Source    Single -- -- -- -- English /Black Black or  --    Financial Resource Strain Food Insecurity: Worry Food Insecurity: Inability Transportation Needs: Medical Transportation Needs: Non-medical    -- -- -- -- --            Pertinent History     Question Response Comments    Lives with -- --    Place in  Birth Order -- --    Lives in -- --    Number of Siblings -- --    Raised by -- --    Legal Involvement -- --    Childhood Trauma -- --    Criminal History of -- --    Financial Status -- --    Highest Level of Education -- --    Does patient have access to a firearm? -- --     Service -- --    Primary Leisure Activity -- --    Spirituality -- --        Past Medical History:   Diagnosis Date    Back pain     GERD (gastroesophageal reflux disease)     Obesity      Past Surgical History:   Procedure Laterality Date    LIVER BIOPSY       Family History     Problem Relation (Age of Onset)    Breast cancer Mother    Cancer Mother        Tobacco Use    Smoking status: Current Every Day Smoker     Types: Cigars    Smokeless tobacco: Never Used    Tobacco comment: 1 black and mild daily    Substance and Sexual Activity    Alcohol use: Yes     Comment: monthly    Drug use: Yes     Types: Marijuana     Comment: once monthly     Sexual activity: Not on file     Review of patient's allergies indicates:  No Known Allergies    No current facility-administered medications on file prior to encounter.      Current Outpatient Medications on File Prior to Encounter   Medication Sig    ETONOGESTREL (IMPLANON SDRM) by Subdermal route.     Psychotherapeutics (From admission, onward)    None        Review of Systems   Constitutional: Positive for fatigue.   HENT: Positive for congestion.    Respiratory: Positive for cough.    Cardiovascular: Negative for chest pain.   Gastrointestinal: Negative for abdominal pain.   Musculoskeletal: Negative for gait problem.   Skin: Negative for rash.   Neurological: Negative for seizures.   Psychiatric/Behavioral: Positive for dysphoric mood.     Strengths and Liabilities: Strength: Patient accepts guidance/feedback, Liability: Patient lacks coping skills.    Objective:     Vital Signs (Most Recent):  Temp: 97.7 °F (36.5 °C) (11/20/19 0923)  Pulse: 68 (11/20/19 0923)  Resp: 18  "(11/20/19 0923)  BP: 123/64 (11/20/19 0923)  SpO2: 99 % (11/20/19 0923) Vital Signs (24h Range):  Temp:  [97.7 °F (36.5 °C)] 97.7 °F (36.5 °C)  Pulse:  [68] 68  Resp:  [18] 18  SpO2:  [99 %] 99 %  BP: (123)/(64) 123/64     Height: 5' 6" (167.6 cm)  Weight: 97.1 kg (214 lb)  Body mass index is 34.54 kg/m².    No intake or output data in the 24 hours ending 11/20/19 1245    Physical Exam   Psychiatric:     PSYCHIATRIC   Appearance: unremarkable, age appropriate, laying in bed  Grooming: fair  Arousal: alert, awake  Behavior/Cooperation: cooperative  Speech: normal tone, normal rate, hoarse, normal volume, spontaneous  Language: appropriate   Mood: "tired, restless, cold"  Affect: tearful, dysphoric  Thought Process: normal and logical  Thought Content: + recent passive SI, denies current SI, no homicidality, delusions, or paranoia   Associations: no loose associations noted  Orientation: aox4  Memory: 3/3 IR , 3/3 SR  Fund of Knowledge: appropriate for education level (2/2 pres)  Attention Span/Concentration: WORLD forwards and backwards with no errors  Cognition: grossly intact  Insight: fair  Judgment: fair          Significant Labs: All pertinent labs within the past 24 hours have been reviewed.    Significant Imaging: I have reviewed all pertinent imaging results/findings within the past 24 hours.  "

## 2019-11-20 NOTE — ASSESSMENT & PLAN NOTE
- Patient advised continued cannabis use will likely lead to worsened anxiety and psychiatric symptoms  - advised to refrain from any illicit drug use including cannabis  - provided addiction services should she decide to utilize in discharge paperwork, patient informed of this, defers any referrals or treatment for cannabis use at this time.

## 2020-04-13 ENCOUNTER — HOSPITAL ENCOUNTER (EMERGENCY)
Facility: HOSPITAL | Age: 25
Discharge: HOME | End: 2020-04-13
Attending: EMERGENCY MEDICINE
Payer: MEDICAID

## 2020-04-13 ENCOUNTER — NURSE TRIAGE (OUTPATIENT)
Dept: ADMINISTRATIVE | Facility: CLINIC | Age: 25
End: 2020-04-13

## 2020-04-13 VITALS
HEART RATE: 87 BPM | HEIGHT: 66 IN | RESPIRATION RATE: 18 BRPM | WEIGHT: 214 LBS | BODY MASS INDEX: 34.39 KG/M2 | TEMPERATURE: 99 F | SYSTOLIC BLOOD PRESSURE: 117 MMHG | DIASTOLIC BLOOD PRESSURE: 56 MMHG | OXYGEN SATURATION: 97 %

## 2020-04-13 DIAGNOSIS — Z20.822 SUSPECTED COVID-19 VIRUS INFECTION: Primary | ICD-10-CM

## 2020-04-13 LAB — SARS-COV-2 RDRP RESP QL NAA+PROBE: NEGATIVE

## 2020-04-13 PROCEDURE — U0002 COVID-19 LAB TEST NON-CDC: HCPCS

## 2020-04-13 PROCEDURE — 99283 EMERGENCY DEPT VISIT LOW MDM: CPT | Mod: ,,, | Performed by: EMERGENCY MEDICINE

## 2020-04-13 PROCEDURE — 99282 EMERGENCY DEPT VISIT SF MDM: CPT

## 2020-04-13 PROCEDURE — 99283 PR EMERGENCY DEPT VISIT,LEVEL III: ICD-10-PCS | Mod: ,,, | Performed by: EMERGENCY MEDICINE

## 2020-04-13 NOTE — ED PROVIDER NOTES
"THIS PATIENT IS BEING EVALUATED AND TREATED DURING THE COVID-19 GLOBAL PANDEMIC.   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  She has also had chills, feeling "hot and cold" though no documented fever.  Took 2 tylenol this am.  + nasal congestion.  No cough/sob.  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    The history is provided by the patient.     Review of patient's allergies indicates:  No Known Allergies  Past Medical History:   Diagnosis Date    Back pain     GERD (gastroesophageal reflux disease)     Obesity      Past Surgical History:   Procedure Laterality Date    LIVER BIOPSY       Family History   Problem Relation Age of Onset    Cancer Mother     Breast cancer Mother      Social History     Tobacco Use    Smoking status: Current Every Day Smoker     Types: Cigars    Smokeless tobacco: Never Used    Tobacco comment: 1 black and mild daily    Substance Use Topics    Alcohol use: Yes     Comment: monthly    Drug use: Yes     Types: Marijuana     Comment: once monthly      Review of Systems   Constitutional: Positive for chills, diaphoresis and fatigue. Negative for fever.   HENT: Positive for congestion.    Respiratory: Positive for chest tightness. Negative for cough and shortness of breath.    Cardiovascular: Positive for chest pain. Negative for palpitations and leg swelling.   Gastrointestinal: Positive for nausea and vomiting. Negative for abdominal pain and diarrhea.   Genitourinary: Negative for dysuria.   Musculoskeletal: Positive for back pain.   Neurological: Negative for headaches.   All other systems reviewed and are negative.      Physical Exam     Initial Vitals   BP Pulse Resp Temp SpO2   04/13/20 1002 04/13/20 0958 04/13/20 0958 04/13/20 0958 04/13/20 " 0958   (!) 117/56 85 18 99.7 °F (37.6 °C) 98 %      MAP       --                Physical Exam    Nursing note and vitals reviewed.    Gen: well-appearing, in NAD  HEENT: MMM  Resp:  unable to auscultate lungs 2/2 PPE and poor quality contact precautions stethoscope   No respiratory distress, no significant tachypnea or accessory muscle use  Skin: no rash  Ext: no edema  Neuro: alert, answering questions appropriately, no focal weakness      ED Course   Procedures  Labs Reviewed   SARS-COV-2 RNA AMPLIFICATION, QUAL    Narrative:     What symptom criteria does the patient meet?->Fever          Imaging Results    None          Medical Decision Making:   History:   Old Medical Records: I decided to obtain old medical records.  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  Plan for d/c home with strict ED return precautions.    [AS]      ED Course User Index  [AS] Mary Martin MD                Clinical Impression:       ICD-10-CM ICD-9-CM   1. Suspected Covid-19 Virus Infection R68.89              ED Disposition Condition    Discharge Stable        ED Prescriptions     None        Follow-up Information     Follow up With Specialties Details Why Contact Info    Alayna Hodges MD Family Medicine   [address] [facility]  [address]  [facility] [address]  [phone]      [facility] Call today  [address] [facility] [address]  [phone]                                     Mary Martin MD  04/14/20  4515

## 2020-04-13 NOTE — ED TRIAGE NOTES
Patient reports to the ED today with reports of cough SOB and chills onset yesterday. Patient endorses generalized body aches. Patient endorses emesis this morning. Patient denies any fevers abdominal pain or diarrhea. Patient works here in dietary. Patient reports taking tylenol this morning

## 2020-04-14 ENCOUNTER — HOSPITAL ENCOUNTER (EMERGENCY)
Facility: HOSPITAL | Age: 25
Discharge: HOME | End: 2020-04-14
Attending: EMERGENCY MEDICINE
Payer: MEDICAID

## 2020-04-14 ENCOUNTER — NURSE TRIAGE (OUTPATIENT)
Dept: ADMINISTRATIVE | Facility: CLINIC | Age: 25
End: 2020-04-14

## 2020-04-14 VITALS
HEIGHT: 62 IN | TEMPERATURE: 101 F | SYSTOLIC BLOOD PRESSURE: 116 MMHG | RESPIRATION RATE: 16 BRPM | WEIGHT: 214 LBS | DIASTOLIC BLOOD PRESSURE: 58 MMHG | OXYGEN SATURATION: 99 % | HEART RATE: 98 BPM | BODY MASS INDEX: 39.38 KG/M2

## 2020-04-14 DIAGNOSIS — B34.9 VIRAL SYNDROME: Primary | ICD-10-CM

## 2020-04-14 PROCEDURE — 96372 THER/PROPH/DIAG INJ SC/IM: CPT

## 2020-04-14 PROCEDURE — 99284 EMERGENCY DEPT VISIT MOD MDM: CPT | Mod: 25

## 2020-04-14 PROCEDURE — 25000003 PHARM REV CODE 250: Performed by: PHYSICIAN ASSISTANT

## 2020-04-14 PROCEDURE — 99285 PR EMERGENCY DEPT VISIT,LEVEL V: ICD-10-PCS | Mod: ,,, | Performed by: PHYSICIAN ASSISTANT

## 2020-04-14 PROCEDURE — 25000003 PHARM REV CODE 250: Performed by: EMERGENCY MEDICINE

## 2020-04-14 PROCEDURE — 99285 EMERGENCY DEPT VISIT HI MDM: CPT | Mod: ,,, | Performed by: PHYSICIAN ASSISTANT

## 2020-04-14 PROCEDURE — 63600175 PHARM REV CODE 636 W HCPCS: Performed by: PHYSICIAN ASSISTANT

## 2020-04-14 RX ORDER — BENZONATATE 100 MG/1
100 CAPSULE ORAL 3 TIMES DAILY PRN
Qty: 30 CAPSULE | Refills: 0 | Status: SHIPPED | OUTPATIENT
Start: 2020-04-14 | End: 2020-04-24

## 2020-04-14 RX ORDER — ONDANSETRON 4 MG/1
4 TABLET, ORALLY DISINTEGRATING ORAL EVERY 6 HOURS PRN
Qty: 25 TABLET | Refills: 0 | OUTPATIENT
Start: 2020-04-14 | End: 2021-05-13

## 2020-04-14 RX ORDER — ONDANSETRON 4 MG/1
4 TABLET, ORALLY DISINTEGRATING ORAL EVERY 6 HOURS PRN
Qty: 25 TABLET | Refills: 0 | Status: SHIPPED | OUTPATIENT
Start: 2020-04-14 | End: 2020-04-14 | Stop reason: SDUPTHER

## 2020-04-14 RX ORDER — ACETAMINOPHEN 325 MG/1
650 TABLET ORAL
Status: COMPLETED | OUTPATIENT
Start: 2020-04-14 | End: 2020-04-14

## 2020-04-14 RX ORDER — IBUPROFEN 400 MG/1
400 TABLET ORAL EVERY 6 HOURS PRN
Qty: 40 TABLET | Refills: 0 | OUTPATIENT
Start: 2020-04-14 | End: 2022-11-05

## 2020-04-14 RX ORDER — KETOROLAC TROMETHAMINE 30 MG/ML
15 INJECTION, SOLUTION INTRAMUSCULAR; INTRAVENOUS
Status: COMPLETED | OUTPATIENT
Start: 2020-04-14 | End: 2020-04-14

## 2020-04-14 RX ORDER — BENZONATATE 100 MG/1
100 CAPSULE ORAL 3 TIMES DAILY PRN
Qty: 30 CAPSULE | Refills: 0 | Status: SHIPPED | OUTPATIENT
Start: 2020-04-14 | End: 2020-04-14 | Stop reason: SDUPTHER

## 2020-04-14 RX ORDER — IBUPROFEN 400 MG/1
400 TABLET ORAL EVERY 6 HOURS PRN
Qty: 40 TABLET | Refills: 0 | Status: SHIPPED | OUTPATIENT
Start: 2020-04-14 | End: 2020-04-14 | Stop reason: SDUPTHER

## 2020-04-14 RX ORDER — ONDANSETRON 8 MG/1
8 TABLET, ORALLY DISINTEGRATING ORAL
Status: COMPLETED | OUTPATIENT
Start: 2020-04-14 | End: 2020-04-14

## 2020-04-14 RX ADMIN — KETOROLAC TROMETHAMINE 15 MG: 30 INJECTION, SOLUTION INTRAMUSCULAR at 06:04

## 2020-04-14 RX ADMIN — ONDANSETRON 8 MG: 8 TABLET, ORALLY DISINTEGRATING ORAL at 06:04

## 2020-04-14 RX ADMIN — ACETAMINOPHEN 650 MG: 325 TABLET ORAL at 06:04

## 2020-04-14 NOTE — ED TRIAGE NOTES
Pt reports having body aches since Sunday. Suspected COVID but tested negative. Chest tightness that radiates to right side. Dry cough. Emesis since Sunday. +SOB.

## 2020-04-14 NOTE — TELEPHONE ENCOUNTER
Pt states she was seen yesterday in ED for coronavirus symptoms. Pt states she feels worse than she did yesterday. Pt c/o constant R sided CP. Pt advised per protocol to ED now and pt verbalizes understanding.     Reason for Disposition   SEVERE or constant chest pain (Exception: mild central chest pain, present only when coughing)    Additional Information   Negative: SEVERE difficulty breathing (e.g., struggling for each breath, speaks in single words)   Negative: Difficult to awaken or acting confused (e.g., disoriented, slurred speech)   Negative: Bluish (or gray) lips or face now   Negative: Shock suspected (e.g., cold/pale/clammy skin, too weak to stand, low BP, rapid pulse)   Negative: Sounds like a life-threatening emergency to the triager    Protocols used: CORONAVIRUS (COVID-19) DIAGNOSED OR [id number]

## 2020-04-14 NOTE — ED PROVIDER NOTES
Encounter Date: 4/14/2020       History     Chief Complaint   Patient presents with    Fever     bodyaches, vomiting, seen here yest ' suspected + per pt     25-year-old female with GERD, obesity and depression presents for myalgias and subjective fever for 2 days.  She reports the symptoms started on Easter evening with severe body aches worst is the back, chest and legs.  She has been taking Tylenol at home without improvement, last dose 2 hr PTA.  Her chest pain is sharp in nature, worse with coughing and deep breathing.  She reports associated subjective fever, chills, dry cough, shortness of breath, nausea and multiple episodes of emesis.  She denies diarrhea, leg swelling, urinary symptoms or sinus congestion.  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        Review of patient's allergies indicates:  No Known Allergies  Past Medical History:   Diagnosis Date    Back pain     GERD (gastroesophageal reflux disease)     Obesity      Past Surgical History:   Procedure Laterality Date    LIVER BIOPSY       Family History   Problem Relation Age of Onset    Cancer Mother     Breast cancer Mother      Social History     Tobacco Use    Smoking status: Current Every Day Smoker     Types: Cigars    Smokeless tobacco: Never Used    Tobacco comment: 1 black and mild daily    Substance Use Topics    Alcohol use: Yes     Comment: monthly    Drug use: Yes     Types: Marijuana     Comment: once monthly      Review of Systems   Constitutional: Positive for chills, fatigue and fever.   HENT: Negative for sore throat.    Respiratory: Positive for cough and shortness of breath.    Cardiovascular: Positive for chest pain. Negative for palpitations and leg swelling.   Gastrointestinal: Positive for abdominal pain, nausea and vomiting. Negative for diarrhea.   Genitourinary: Negative for dysuria, flank pain and hematuria.   Musculoskeletal: Positive for back pain and myalgias.   Skin: Negative for rash.    Allergic/Immunologic: Negative for immunocompromised state.   Neurological: Negative for weakness.   Hematological: Does not bruise/bleed easily.       Physical Exam     Initial Vitals [04/14/20 1803]   BP Pulse Resp Temp SpO2   (!) 119/53 100 18 100 °F (37.8 °C) 99 %      MAP       --         Physical Exam    Nursing note and vitals reviewed.  Constitutional: She appears well-developed and well-nourished. She is not diaphoretic. No distress.   Patient tearful   HENT:   Head: Normocephalic and atraumatic.   Eyes: EOM are normal. Pupils are equal, round, and reactive to light.   Neck: Normal range of motion.   Cardiovascular: Normal rate, regular rhythm, normal heart sounds and intact distal pulses. Exam reveals no gallop and no friction rub.    No murmur heard.  No lower extremity edema, erythema, tenderness or   Pulmonary/Chest: Breath sounds normal. No respiratory distress. She has no wheezes. She has no rhonchi. She has no rales. She exhibits no tenderness.   Abdominal: Soft. Bowel sounds are normal. She exhibits no distension and no mass. There is no tenderness. There is no rebound and no guarding.   Musculoskeletal: Normal range of motion.   Neurological: She is alert and oriented to person, place, and time.   Skin: Skin is warm and dry.         ED Course   Procedures  Labs Reviewed - No data to display       Imaging Results          X-Ray Chest AP Portable (Final result)  Result time 04/14/20 18:57:57    Final result by Robert Grijalva MD (04/14/20 18:57:57)                 Impression:      No acute process.      Electronically signed by: Robert Grijalva MD  Date:    04/14/2020  Time:    18:57             Narrative:    EXAMINATION:  XR CHEST AP PORTABLE    CLINICAL HISTORY:  chest pain;    TECHNIQUE:  Single frontal view of the chest was performed.    COMPARISON:  None    FINDINGS:  The trachea is unremarkable.  The cardiomediastinal silhouette is within normal limits.  The hilar structures are unremarkable.  The  hemidiaphragms are within normal limits.  There is no evidence of free air beneath the hemidiaphragms.  There are no pleural effusions.  There is no evidence of a pneumothorax.  There is no evidence of pneumomediastinum.  No airspace opacity is present.  The osseous structures are unremarkable.                                 Medical Decision Making:   History:   Old Medical Records: I decided to obtain old medical records.  Old Records Summarized: other records.       <> Summary of Records: Patient seen yesterday in the ED for myalgias and chest pain.  Her COVID-19 test at that time was negative.  Initial Assessment:   25-year-old female presenting for myalgias and fever.  Temperature elevated to 100° F, vitals otherwise normal.  She appears upset and tearful but nontoxic.  Differential Diagnosis:   Her symptoms are consistent with viral syndrome  Although her COVID-19 test yesterday was negative, this is still a possibility  Lower suspicion for viral or bacterial pneumonia but given her chest pain, shortness of breath, cough and fever will do chest x-ray  I doubt dangerous dehydration given normal heart rate      Independently Interpreted Test(s):   I have ordered and independently interpreted X-rays - see summary below.       <> Summary of X-Ray Reading(s): No consolidation  Clinical Tests:   Lab Tests: Reviewed  Radiological Study: Reviewed and Ordered  ED Management:  Do chest x-ray, give Toradol, Tylenol, Zofran and reassess.    Chest x-ray without any consolidation.  Patient reports improvement of her symptoms after therapy.  She is able to tolerate p.o. intake.  She has stopped crying and look significantly more comfortable.  I suspect her symptoms are due to a viral syndrome.  I explained to her that although her COVID-19 yesterday was negative it is still possible that she has in fact with a virus will need to self isolated until she has been afebrile without antipyretics for 3 days with overall  improvement of symptoms.  Work note provided.  Will discharge with ibuprofen, Zofran and Tessalon Perles for symptomatic improvement. Stressed the importance of follow-up, strict ED return precautions given.  Patient voiced understanding and is comfortable with discharge.                   ED Course as of Apr 14 1940   Tue Apr 14, 2020 1900 No consolidation or pulmonary edema   X-Ray Chest AP Portable [CC]      ED Course User Index  [CC] Cathy Mina PA-C                Clinical Impression:       ICD-10-CM ICD-9-CM   1. Viral syndrome B34.9 079.99         Disposition:   Disposition: Discharged  Condition: Stable                        Cathy Mina PA-C  04/14/20 1943

## 2020-04-14 NOTE — ED NOTES
"Walking O2 sats dropped from 99% to 96%. Pt is tearful and states "my legs are hurting and my chest."   "

## 2020-04-15 NOTE — DISCHARGE INSTRUCTIONS
Your chest x-ray shows no signs of pneumonia.  I suspect that all of your symptoms are due to a virus.  Your Coronavirus test yesterday was negative, however it is still possible that you are infected with the virus.  You will likely feel sick for at least a week or 2.  You should self isolate until you feel well.  You can return to work when you have been fever free without medicine for at least 3 days and have improvement of your other symptoms.    I am prescribing medicine for body aches, nausea and cough that you can take as needed.  You can take Tylenol in addition to this, up to 3 g daily which is 6 of the 500 mg extra strength tablets.  You should take Tylenol and ibuprofen scheduled every 6 hr to avoid spikes in temperature and body aches.  If you start to have severe shortness of breath or unable to keep anything down please return to the emergency department.

## 2020-05-05 ENCOUNTER — TELEPHONE (OUTPATIENT)
Dept: ADMINISTRATIVE | Facility: CLINIC | Age: 25
End: 2020-05-05

## 2021-04-28 ENCOUNTER — PATIENT MESSAGE (OUTPATIENT)
Dept: RESEARCH | Facility: HOSPITAL | Age: 26
End: 2021-04-28

## 2021-11-23 ENCOUNTER — HOSPITAL ENCOUNTER (EMERGENCY)
Facility: OTHER | Age: 26
Discharge: HOME | End: 2021-11-24
Attending: EMERGENCY MEDICINE
Payer: MEDICAID

## 2021-11-23 VITALS
BODY MASS INDEX: 40.48 KG/M2 | RESPIRATION RATE: 19 BRPM | OXYGEN SATURATION: 96 % | TEMPERATURE: 98 F | WEIGHT: 220 LBS | DIASTOLIC BLOOD PRESSURE: 69 MMHG | SYSTOLIC BLOOD PRESSURE: 136 MMHG | HEART RATE: 108 BPM | HEIGHT: 62 IN

## 2021-11-23 DIAGNOSIS — J10.1 INFLUENZA A: Primary | ICD-10-CM

## 2021-11-23 DIAGNOSIS — B34.9 VIRAL SYNDROME: ICD-10-CM

## 2021-11-23 DIAGNOSIS — R09.81 NASAL CONGESTION: ICD-10-CM

## 2021-11-23 PROCEDURE — U0002 COVID-19 LAB TEST NON-CDC: HCPCS | Performed by: EMERGENCY MEDICINE

## 2021-11-23 PROCEDURE — 99282 EMERGENCY DEPT VISIT SF MDM: CPT | Mod: 25

## 2021-11-24 LAB
CTP QC/QA: YES
CTP QC/QA: YES
POC MOLECULAR INFLUENZA A AGN: POSITIVE
POC MOLECULAR INFLUENZA B AGN: NEGATIVE
SARS-COV-2 RDRP RESP QL NAA+PROBE: NEGATIVE

## 2022-01-23 ENCOUNTER — HOSPITAL ENCOUNTER (EMERGENCY)
Facility: OTHER | Age: 27
Discharge: HOME | End: 2022-01-23
Attending: EMERGENCY MEDICINE
Payer: MEDICAID

## 2022-01-23 VITALS
WEIGHT: 220 LBS | RESPIRATION RATE: 17 BRPM | HEIGHT: 62 IN | TEMPERATURE: 98 F | SYSTOLIC BLOOD PRESSURE: 103 MMHG | HEART RATE: 69 BPM | OXYGEN SATURATION: 97 % | DIASTOLIC BLOOD PRESSURE: 58 MMHG | BODY MASS INDEX: 40.48 KG/M2

## 2022-01-23 DIAGNOSIS — R10.11 RUQ PAIN: ICD-10-CM

## 2022-01-23 DIAGNOSIS — R10.11 RIGHT UPPER QUADRANT ABDOMINAL PAIN: Primary | ICD-10-CM

## 2022-01-23 LAB
ALBUMIN SERPL BCP-MCNC: 3.5 G/DL (ref 3.5–5.2)
ALP SERPL-CCNC: 63 U/L (ref 55–135)
ALT SERPL W/O P-5'-P-CCNC: 8 U/L (ref 10–44)
ANION GAP SERPL CALC-SCNC: 10 MMOL/L (ref 8–16)
AST SERPL-CCNC: 13 U/L (ref 10–40)
B-HCG UR QL: NEGATIVE
BASOPHILS # BLD AUTO: 0.01 K/UL (ref 0–0.2)
BASOPHILS NFR BLD: 0.2 % (ref 0–1.9)
BILIRUB SERPL-MCNC: 0.6 MG/DL (ref 0.1–1)
BILIRUB UR QL STRIP: NEGATIVE
BUN SERPL-MCNC: 8 MG/DL (ref 6–20)
CALCIUM SERPL-MCNC: 8.5 MG/DL (ref 8.7–10.5)
CHLORIDE SERPL-SCNC: 109 MMOL/L (ref 95–110)
CLARITY UR: CLEAR
CO2 SERPL-SCNC: 21 MMOL/L (ref 23–29)
COLOR UR: YELLOW
CREAT SERPL-MCNC: 0.8 MG/DL (ref 0.5–1.4)
CTP QC/QA: YES
DIFFERENTIAL METHOD: ABNORMAL
EOSINOPHIL # BLD AUTO: 0.2 K/UL (ref 0–0.5)
EOSINOPHIL NFR BLD: 2.4 % (ref 0–8)
ERYTHROCYTE [DISTWIDTH] IN BLOOD BY AUTOMATED COUNT: 12.8 % (ref 11.5–14.5)
EST. GFR  (AFRICAN AMERICAN): >60 ML/MIN/1.73 M^2
EST. GFR  (NON AFRICAN AMERICAN): >60 ML/MIN/1.73 M^2
GLUCOSE SERPL-MCNC: 93 MG/DL (ref 70–110)
GLUCOSE UR QL STRIP: NEGATIVE
HCT VFR BLD AUTO: 35 % (ref 37–48.5)
HCV AB SERPL QL IA: NEGATIVE
HGB BLD-MCNC: 11.5 G/DL (ref 12–16)
HGB UR QL STRIP: NEGATIVE
HIV 1+2 AB+HIV1 P24 AG SERPL QL IA: NEGATIVE
IMM GRANULOCYTES # BLD AUTO: 0.02 K/UL (ref 0–0.04)
IMM GRANULOCYTES NFR BLD AUTO: 0.3 % (ref 0–0.5)
KETONES UR QL STRIP: NEGATIVE
LEUKOCYTE ESTERASE UR QL STRIP: NEGATIVE
LIPASE SERPL-CCNC: 13 U/L (ref 4–60)
LYMPHOCYTES # BLD AUTO: 1.4 K/UL (ref 1–4.8)
LYMPHOCYTES NFR BLD: 21.7 % (ref 18–48)
MCH RBC QN AUTO: 30.9 PG (ref 27–31)
MCHC RBC AUTO-ENTMCNC: 32.9 G/DL (ref 32–36)
MCV RBC AUTO: 94 FL (ref 82–98)
MONOCYTES # BLD AUTO: 0.6 K/UL (ref 0.3–1)
MONOCYTES NFR BLD: 9.6 % (ref 4–15)
NEUTROPHILS # BLD AUTO: 4.2 K/UL (ref 1.8–7.7)
NEUTROPHILS NFR BLD: 65.8 % (ref 38–73)
NITRITE UR QL STRIP: NEGATIVE
NRBC BLD-RTO: 0 /100 WBC
PH UR STRIP: 6 [PH] (ref 5–8)
PLATELET # BLD AUTO: 203 K/UL (ref 150–450)
PMV BLD AUTO: 9 FL (ref 9.2–12.9)
POTASSIUM SERPL-SCNC: 3.8 MMOL/L (ref 3.5–5.1)
PROT SERPL-MCNC: 6.2 G/DL (ref 6–8.4)
PROT UR QL STRIP: NEGATIVE
RBC # BLD AUTO: 3.72 M/UL (ref 4–5.4)
SODIUM SERPL-SCNC: 140 MMOL/L (ref 136–145)
SP GR UR STRIP: >=1.03 (ref 1–1.03)
URN SPEC COLLECT METH UR: ABNORMAL
UROBILINOGEN UR STRIP-ACNC: NEGATIVE EU/DL
WBC # BLD AUTO: 6.37 K/UL (ref 3.9–12.7)

## 2022-01-23 PROCEDURE — 99284 EMERGENCY DEPT VISIT MOD MDM: CPT | Mod: 25

## 2022-01-23 PROCEDURE — 81025 URINE PREGNANCY TEST: CPT | Performed by: EMERGENCY MEDICINE

## 2022-01-23 PROCEDURE — 96374 THER/PROPH/DIAG INJ IV PUSH: CPT

## 2022-01-23 PROCEDURE — 87389 HIV-1 AG W/HIV-1&-2 AB AG IA: CPT | Performed by: EMERGENCY MEDICINE

## 2022-01-23 PROCEDURE — 81003 URINALYSIS AUTO W/O SCOPE: CPT | Performed by: EMERGENCY MEDICINE

## 2022-01-23 PROCEDURE — 80053 COMPREHEN METABOLIC PANEL: CPT | Performed by: EMERGENCY MEDICINE

## 2022-01-23 PROCEDURE — 85025 COMPLETE CBC W/AUTO DIFF WBC: CPT | Performed by: EMERGENCY MEDICINE

## 2022-01-23 PROCEDURE — 83690 ASSAY OF LIPASE: CPT | Performed by: EMERGENCY MEDICINE

## 2022-01-23 PROCEDURE — 25000003 PHARM REV CODE 250: Performed by: EMERGENCY MEDICINE

## 2022-01-23 PROCEDURE — 63600175 PHARM REV CODE 636 W HCPCS: Performed by: EMERGENCY MEDICINE

## 2022-01-23 PROCEDURE — 86803 HEPATITIS C AB TEST: CPT | Performed by: EMERGENCY MEDICINE

## 2022-01-23 RX ORDER — KETOROLAC TROMETHAMINE 30 MG/ML
15 INJECTION, SOLUTION INTRAMUSCULAR; INTRAVENOUS
Status: COMPLETED | OUTPATIENT
Start: 2022-01-23 | End: 2022-01-23

## 2022-01-23 RX ORDER — IBUPROFEN 600 MG/1
600 TABLET ORAL EVERY 6 HOURS PRN
Qty: 20 TABLET | Refills: 0 | OUTPATIENT
Start: 2022-01-23 | End: 2022-11-05

## 2022-01-23 RX ORDER — ONDANSETRON 4 MG/1
4 TABLET, ORALLY DISINTEGRATING ORAL EVERY 6 HOURS PRN
Qty: 20 TABLET | Refills: 0 | Status: SHIPPED | OUTPATIENT
Start: 2022-01-23 | End: 2022-01-30

## 2022-01-23 RX ADMIN — ALUMINUM HYDROXIDE, MAGNESIUM HYDROXIDE, DIMETHICONE 50 ML: 200; 200; 20 LIQUID ORAL at 07:01

## 2022-01-23 RX ADMIN — KETOROLAC TROMETHAMINE 15 MG: 30 INJECTION, SOLUTION INTRAMUSCULAR at 09:01

## 2022-01-23 NOTE — ED TRIAGE NOTES
The patient c/o RUQ abdominal pain that started yesterday. Patient states she has a hx of acid reflux and she thinks it might be related. When patient uses the restroom she gets sharp lower abdominal pain that radiates to her back. Patient has been having BM's all day yesterday that are soft but not diarrhea. Pain is worse in the RUQ with deep inspiration. Patient denies coughing.

## 2022-01-23 NOTE — ED PROVIDER NOTES
SCRIBE #1 NOTE: IShonda, am scribing for, and in the presence of, Gavin Niño DO.         Source of History:  The patient     Chief complaint:  Abdominal Pain (RUQ x1 day- sharp that worsens with  movement. States belching helps. Denies N/V/D, fever)      HPI:  Veda Salgado is a 27 y.o. female with a PMHx of GERD presenting with sharp abdominal pain that began 1 day ago. The patient reports that following eating a meal of broccoli & cheese with fried shrimp yesterday, she feel asleep and woke up an hour later with abdominal pain in the RUQ. She states that the pain continued and began to radiate through her back throughout the day. She then ate a dinner of dry cheerios, 1 biscuit and a piece of cookie cake and the pain continued. She notes that she was unable to lay on her side due to the pain which prevented her from sleeping. She also notes that she has mild diarrhea and feels pressure in her abdomen when urinating. She started her period 4 days ago (01/19) and has increased bowel movement due to her menstrual cycle. She denies any dysuria or fever.    This is the extent to the patients complaints today here in the emergency department.    ROS: As per HPI and below:  Constitutional: No fever.  No chills.  Eyes: No visual changes.  ENT: No sore throat. No ear pain    Cardiovascular: No chest pain.  Respiratory: No shortness of breath.  GI: Positive for abdominal pain. Positive for mild diarrhea.   Genitourinary: Positive for pressure when urinating. No dysuria.  Neurologic: No headache. No focal weakness.  No numbness.  MSK: no back pain.  Integument: No rashes or lesions.  Hematologic: No easy bruising.  Endocrine: No excessive thirst or urination.    Review of patient's allergies indicates:  No Known Allergies    PMH:  As per HPI and below:  Past Medical History:   Diagnosis Date    Back pain     GERD (gastroesophageal reflux disease)     Obesity      Past Surgical History:  "  Procedure Laterality Date    LIVER BIOPSY         Social History     Tobacco Use    Smoking status: Current Every Day Smoker     Types: Cigars    Smokeless tobacco: Never Used    Tobacco comment: 1 black and mild daily    Substance Use Topics    Alcohol use: Yes     Comment: monthly    Drug use: Yes     Types: Marijuana     Comment: once monthly        Physical Exam:    BP (!) 100/55   Pulse 72   Temp 98.4 °F (36.9 °C) (Oral)   Resp 17   Ht 5' 2" (1.575 m)   Wt 99.8 kg (220 lb)   SpO2 97%   BMI 40.24 kg/m²   Nursing note and vital signs reviewed.  Constitutional: No acute distress.  Eyes: No conjunctival injection.  Extraocular muscles are intact.  ENT: Oropharynx clear.  Normal phonation.  Cardiovascular: Regular rate and rhythm.  No murmurs. No gallops. No rubs  Respiratory: Clear to auscultation bilaterally.  Good air movement.  No wheezes.  No rhonchi. No rales. No accessory muscle use.  Abdomen: Tenderness to palpation in RUQ with guarding. Mild suprapubic tenderness to palpation.  Negative Jennings sign.  Remainder the abdomen soft nontender nondistended non peritoneal.  Musculoskeletal: Good range of motion all joints.  No deformities.  Neck supple.  No meningismus.  Skin: No rashes seen.  Good turgor.  No abrasions.  No ecchymoses.  Neuro: alert and oriented x3,  no focal neurological deficits.  Psych: Appropriate, conversant    Labs that have been ordered have been independently reviewed and interpreted by myself.    I decided to obtain the patient's medical records.  Summary of Medical Records:      MDM/ Differential Dx:   27 y.o. female with right upper quadrant tenderness palpation.  Differential include gastritis, cholelithiasis, musculoskeletal strain.  Do not suspect appendicitis or acute coronary syndrome.    ED Course as of 01/23/22 1021   Sun Jan 23, 2022   0838 ALT(!): 8 [SM]   0838 BILIRUBIN TOTAL: 0.6 [SM]   0838 Hemoglobin(!): 11.5 [SM]   0838 WBC: 6.37 [SM]   0925 Lipase: 13 [SM] "   0925 US Abdomen Limited  Ultrasound gallbladder shows no evidence of cholelithiasis. [SM]   0925 Upon re-evaluation the patient states the pain had improved but then rolled over now is coming back.  Does not appear to be consistent with cholelithiasis.  Likely musculoskeletal.  Will give Toradol and re-evaluate.  Then plan for discharge.  Urinalysis also negative and no suspicion for renal colic. [SM]   1019 On re-evaluation patient is feeling better.  Discussed results with the patient.  Likely symptoms due to musculoskeletal.    No further workup indicated based on their complaints or examination today.    Patient to be discharged home in stable condition. Diagnosis and treatment plan explained to patient and/or family member who is at bedside. I have answered all questions and the patient is satisfied with the plan of care. Strict return precautions given. The patient demonstrates understanding of the care plan.     [SM]      ED Course User Index  [SM] Gavin Niño DO              Scribe Attestation:   Scribe #1: I performed the above scribed service and the documentation accurately describes the services I performed. I attest to the accuracy of the note.      Physician Attestation for Scribe: I, Dr Niño, reviewed documentation as scribed in my presence, which is both accurate and complete.    Diagnostic Impression:    1. Right upper quadrant abdominal pain    2. RUQ pain         ED Disposition Condition    Discharge Stable          ED Prescriptions     Medication Sig Dispense Start Date End Date Auth. Provider    ibuprofen (ADVIL,MOTRIN) 600 MG tablet Take 1 tablet (600 mg total) by mouth every 6 (six) hours as needed for Pain. 20 tablet 1/23/2022  Gavin Niño, DO    ondansetron (ZOFRAN-ODT) 4 MG TbDL Take 1 tablet (4 mg total) by mouth every 6 (six) hours as needed. 20 tablet 1/23/2022 1/30/2022 Gavin Niño, DO        Follow-up Information     Follow up With Specialties Details  Why Contact Info    Alayna Hodges MD Family Medicine Schedule an appointment as soon as possible for a visit   [id number] [facility] [address]  [facility] [address]  [phone]             Gavin Niño,   01/23/22 1021

## 2022-02-08 ENCOUNTER — HOSPITAL ENCOUNTER (EMERGENCY)
Facility: OTHER | Age: 27
Discharge: HOME | End: 2022-02-08
Attending: EMERGENCY MEDICINE
Payer: MEDICAID

## 2022-02-08 VITALS
DIASTOLIC BLOOD PRESSURE: 67 MMHG | BODY MASS INDEX: 36.8 KG/M2 | TEMPERATURE: 98 F | OXYGEN SATURATION: 100 % | SYSTOLIC BLOOD PRESSURE: 133 MMHG | HEIGHT: 62 IN | RESPIRATION RATE: 20 BRPM | WEIGHT: 200 LBS | HEART RATE: 78 BPM

## 2022-02-08 DIAGNOSIS — S61.432A PUNCTURE WOUND OF LEFT HAND WITHOUT FOREIGN BODY, INITIAL ENCOUNTER: Primary | ICD-10-CM

## 2022-02-08 PROCEDURE — 12001 RPR S/N/AX/GEN/TRNK 2.5CM/<: CPT

## 2022-02-08 PROCEDURE — 99283 EMERGENCY DEPT VISIT LOW MDM: CPT | Mod: 25

## 2022-02-08 PROCEDURE — 25000003 PHARM REV CODE 250: Performed by: PHYSICIAN ASSISTANT

## 2022-02-08 RX ORDER — ACETAMINOPHEN 500 MG
1000 TABLET ORAL
Status: COMPLETED | OUTPATIENT
Start: 2022-02-08 | End: 2022-02-08

## 2022-02-08 RX ADMIN — ACETAMINOPHEN 1000 MG: 500 TABLET, FILM COATED ORAL at 10:02

## 2022-02-09 NOTE — ED NOTES
Pt. Complains of 10/10 pain on her left hand between thumb and pointer finger. Pt. Was cutting up meat with a knife and cut herself 2cm laceration,bleeding is controlled. Pt. Denies no medical HX. Pt. Denies Loc.SOB,N/V,Chest or ABD pain.

## 2022-02-09 NOTE — ED PROVIDER NOTES
"     Source of History:  Patient     Chief complaint:  Laceration (Pt cut Left palm/thumb region with knife while cutting plastic wrap. Pt not UTD on Tdap)    HPI:  Veda Salgado is a 27 y.o. female presenting to the ED with a puncture wound to left hand after she was cutting a piece of plastic off of a meat product while at work.  She punctured the webspace between her thumb and her index finger.  She states this area is throbbing and she has intermittent paresthesias to all of her fingers.  She denies numbness.  She is able to move all of her digits.  Her last tetanus was in May of 2017. This was a clean knife.  She is right hand dominant.   This is the extent to the patients complaints today here in the emergency department.    ROS: As per HPI and below:  General: No fever.  No chills.  No fatigue.  Respiratory: No shortness of breath.  No cough.  Neurologic: No focal weakness.  No numbness.  MSK: + pain to palm of left hand  Integument: + puncture wound to left hand.  Allergy/immunology:  Not immunocompromised.    Review of patient's allergies indicates:  No Known Allergies    PMH:  As per HPI and below:  Past Medical History:   Diagnosis Date    Back pain     GERD (gastroesophageal reflux disease)     Obesity      Past Surgical History:   Procedure Laterality Date    LIVER BIOPSY         Social History     Tobacco Use    Smoking status: Current Every Day Smoker     Types: Cigars    Smokeless tobacco: Never Used    Tobacco comment: 1 black and mild daily    Substance Use Topics    Alcohol use: Yes     Comment: monthly    Drug use: Yes     Types: Marijuana     Comment: once monthly        Physical Exam:    /67 (BP Location: Left arm, Patient Position: Sitting)   Pulse 78   Temp 99 °F (37.2 °C) (Oral)   Resp 20   Ht 5' 2" (1.575 m)   Wt 90.7 kg (200 lb)   SpO2 100%   BMI 36.58 kg/m²   Nursing note and vital signs reviewed.  Appearance: No acute distress.  ENT: Normal " phonation.  Cardiac:  2+ radial pulse to left upper extremity  Musculoskeletal:  Left hand: Good range of motion all digits.  No deformities.   Neuro:  Normal sensation to light touch to palmar and dorsal aspect of left hand.  No signs ulnar, median and radial nerve injury  Skin: 0.5 cm hemostatic puncture wound to the web space between the left thumb and left first digit. Mental Status:  Alert  Labs that have been ordered have been independently reviewed and interpreted by myself.    Lac Repair    Date/Time: 2/8/2022 10:57 PM  Performed by: Richard Barillas PA-C  Authorized by: Marco Garcia MD     Consent:     Consent obtained:  Verbal  Anesthesia:     Anesthesia method:  None  Laceration details:     Location:  Hand    Hand location:  L palm    Length (cm):  0.5  Exploration:     Imaging outcome: foreign body not noted      Wound extent: no foreign bodies/material noted, no nerve damage noted, no tendon damage noted, no underlying fracture noted and no vascular damage noted    Treatment:     Area cleansed with:  Saline    Amount of cleaning:  Standard  Skin repair:     Repair method:  Steri-Strips and tissue adhesive    Number of Steri-Strips:  2  Approximation:     Approximation:  Loose  Post-procedure details:     Dressing:  Non-adherent dressing    Procedure completion:  Tolerated well, no immediate complications        I decided to obtain the patient's medical records.    MDM/ Differential Dx:    27 y.o. female presenting to the emergency department the puncture wound to her left hand from a clean knife.  She is afebrile, nontoxic appearing hemodynamically stable.  Limb is distally neurovascular intact.  X-ray of left hand reveal no osseous abnormality.  Wound was repaired as described in procedure note.  Patient given wound care, pain instructions.           Diagnostic Impression:    1. Puncture wound of left hand without foreign body, initial encounter                  Richard Barillas PA-C  02/08/22  [id number]

## 2022-11-05 ENCOUNTER — HOSPITAL ENCOUNTER (EMERGENCY)
Facility: OTHER | Age: 27
Discharge: HOME | End: 2022-11-05
Attending: EMERGENCY MEDICINE
Payer: MEDICAID

## 2022-11-05 VITALS
BODY MASS INDEX: 41.04 KG/M2 | HEIGHT: 62 IN | TEMPERATURE: 98 F | OXYGEN SATURATION: 99 % | DIASTOLIC BLOOD PRESSURE: 70 MMHG | RESPIRATION RATE: 16 BRPM | HEART RATE: 72 BPM | WEIGHT: 223 LBS | SYSTOLIC BLOOD PRESSURE: 116 MMHG

## 2022-11-05 DIAGNOSIS — S01.81XA FACIAL LACERATION, INITIAL ENCOUNTER: Primary | ICD-10-CM

## 2022-11-05 LAB
B-HCG UR QL: NEGATIVE
CTP QC/QA: YES

## 2022-11-05 PROCEDURE — 81025 URINE PREGNANCY TEST: CPT | Performed by: EMERGENCY MEDICINE

## 2022-11-05 PROCEDURE — 12011 RPR F/E/E/N/L/M 2.5 CM/<: CPT

## 2022-11-05 PROCEDURE — 99283 EMERGENCY DEPT VISIT LOW MDM: CPT | Mod: 25

## 2022-11-05 PROCEDURE — 25000003 PHARM REV CODE 250: Performed by: EMERGENCY MEDICINE

## 2022-11-05 RX ORDER — LIDOCAINE HYDROCHLORIDE AND EPINEPHRINE 20; 10 MG/ML; UG/ML
10 INJECTION, SOLUTION INFILTRATION; PERINEURAL
Status: COMPLETED | OUTPATIENT
Start: 2022-11-05 | End: 2022-11-05

## 2022-11-05 RX ORDER — IBUPROFEN 600 MG/1
600 TABLET ORAL EVERY 6 HOURS PRN
Qty: 20 TABLET | Refills: 0 | OUTPATIENT
Start: 2022-11-05 | End: 2022-11-15

## 2022-11-05 RX ORDER — IBUPROFEN 600 MG/1
600 TABLET ORAL
Status: COMPLETED | OUTPATIENT
Start: 2022-11-05 | End: 2022-11-05

## 2022-11-05 RX ADMIN — LIDOCAINE HYDROCHLORIDE AND EPINEPHRINE 10 ML: 20; 10 INJECTION, SOLUTION INFILTRATION; PERINEURAL at 07:11

## 2022-11-05 RX ADMIN — IBUPROFEN 600 MG: 600 TABLET, FILM COATED ORAL at 07:11

## 2022-11-05 RX ADMIN — BACITRACIN ZINC, NEOMYCIN, POLYMYXIN B 1 EACH: 400; 3.5; 5 OINTMENT TOPICAL at 07:11

## 2022-11-05 RX ADMIN — Medication 1 ML: at 07:11

## 2022-11-05 NOTE — DISCHARGE INSTRUCTIONS
Keep your wound cleaned and not crusty.  It is okay to wash it gently with soap and water and pat dry.  Keep it moist with antibiotic ointment such as Neosporin or triple antibiotic ointment, this will help prevent crusting and scabbing.  Return in 5 days for suture removal or sooner if your concern for any signs of infection.
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
## 2022-11-07 ENCOUNTER — PATIENT OUTREACH (OUTPATIENT)
Dept: EMERGENCY MEDICINE | Facility: OTHER | Age: 27
End: 2022-11-07
Payer: MEDICAID

## 2022-11-08 NOTE — PROGRESS NOTES
Tona Stone LPN  ED Navigator  Emergency Department    [place] [facility] [place]  Role: Community Health Worker    Date: 11/08/2022  Patient Name: Veda Salgado  MRN: [id number]  PCP: Alayna Hodges MD    Assessment:     Veda Salgado is a 27 y.o. female who has presented to ED for Laceration. Patient has visited the ED 1 times in the past 3 months. Patient did not contact PCP.     ED Navigator Initial Assessment    ED Navigator Enrollment Documentation  Consent to Services  Does patient consent to completing the assessment?: Yes  Contact  Method of Initial Contact: Phone  Transportation  Does the patient have issues with Transportation?: No  Does the patient have transportation to and from healthcare appointments?: Yes  Insurance Coverage  Do you have coverage/adequate coverage?: Yes  Type/kind of coverage: [facility] COMMUNITY PLAN [facility] ([place]  Is patient able to afford co-pays/deductibles?: Yes  Is patient able to afford HME or supplies?: Yes  Does patient have an established [facility] PCP?: Yes  Able to access?: Yes  Does the patient have a lack of adequate coverage?: No  Specialist Appointment  Did the patient come to the ED to see a specialist?: No  Does the patient have a pending specialist referral?: No  Does the patient have a specialist appointment made?: No  PCP Follow Up Appointment  Has the patient had an appointment with a primary care provider in the past year?: Yes  Approximate date: 3/29/22  Provider: Jing Carr MD  Does the patient have a follow up appontment with a PCP?: No  When was the last time you saw your PCP?: 3/29/22  Why does the patient not have a follow up scheduled?: Other (see comments) (Comment: Not scheduled yet)  Medications  Is patient able to afford medication?: Yes  Is patient unable to get medication due to lack of transportation?: No  Psychological  Does the patient have psycho-social concerns?: No  Food  Does the patient have concerns about  food?: No  Communication/Education  Does the patient have limited English proficiency/English not primary language?: No  Does patient have low literacy and/or low health literacy?: Yes  Does patient have concerns with care?: No  Does patient have dissatisfaction with care?: No  Other Financial Concerns  Does the patient have immediate financial distress?: No  Does the patient have general financial concerns?: No  Other Social Barriers/Concerns  Does the patient have any additional barriers or concerns?: None  Primary Barrier  Barriers identified: Cognitive barrier (health literacy, language and communication, etc.)  Root Cause of ED Utilization: Patient Knowledge/Low Health Literacy  Plan to address Patient Knowledge/Low Health Literacy: Provided information for [facility] On [facility] 24/7 Nurse triage line [phone] or 1-866-[facility] ([phone])  Next steps: Provided Education  Was education/educational materials provided surrounding PCP services/creating a medical home?: Yes Was education verbal or written?: Written     Was education/educational materials provided surrounding low cost, healthy foods?: Yes Was education verbal or written?: Written     Was education/educational materials provided surrounding other items? If so, use comment to explain.: Yes (Comment: [place] Virtual visit flyer, eating healthy plate, [facility], [facility] on call RN #, [facility] PCP scheduling assistance) Was education verbal or written?: Written   Plan: Provided information for [facility] 24/7 Nurse triage line, [phone] or 1-866-[facility] ([phone])  Expected Date of Follow Up 1: 1/25/23  Additional Documentation: Spoke with patient today and she was agreeable to enrollment and subsequent F/U calls. Pt. Denies any psychosocial needs at this time. Pt. Is established with [facility] and was last seen on 03/29/22. Pt. Is a current smoker and smoking cessation services information was provided via e-mail. Pt. Denied  the need for any outside community resources at this time. [facility] form, [place] Virtual Visit Flyer, [facility] PCP scheduling assistance, [facility] on call RN#, and Heart Healthy Diet education all sent to e-mail.         Social History     Socioeconomic History    Marital status: Single   Tobacco Use    Smoking status: Every Day     Types: Cigars    Smokeless tobacco: Never    Tobacco comments:     1 black and mild daily    Substance and Sexual Activity    Alcohol use: Yes     Comment: monthly    Drug use: Yes     Types: Marijuana     Comment: once monthly      Social Determinants of Health     Financial Resource Strain: Low Risk     Difficulty of Paying Living Expenses: Not hard at all   Food Insecurity: No Food Insecurity    Worried About Running Out of Food in the Last Year: Never true    Ran Out of Food in the Last Year: Never true   Transportation Needs: No Transportation Needs    Lack of Transportation (Medical): No    Lack of Transportation (Non-Medical): No   Stress: No Stress Concern Present    Feeling of Stress : Not at all   Social Connections: Unknown    Marital Status: Never    Housing Stability: Unknown    Unable to Pay for Housing in the Last Year: No    Unstable Housing in the Last Year: No       Plan:   Spoke with patient today and she was agreeable to enrollment and subsequent F/U calls. Pt. Denies any psychosocial needs at this time. Pt. Is established with [facility] and was last seen on 03/29/22. Pt. Is a current smoker and smoking cessation services information was provided via e-mail. Pt. Denied the need for any outside community resources at this time. [facility] form, [place] Virtual Visit Flyer, [facility] PCP scheduling assistance, [facility] on call RN#, and Heart Healthy Diet education all sent to e-mail.

## 2022-11-15 ENCOUNTER — HOSPITAL ENCOUNTER (EMERGENCY)
Facility: OTHER | Age: 27
Discharge: HOME | End: 2022-11-15
Attending: EMERGENCY MEDICINE
Payer: MEDICAID

## 2022-11-15 VITALS
WEIGHT: 203 LBS | HEIGHT: 62 IN | RESPIRATION RATE: 16 BRPM | HEART RATE: 84 BPM | OXYGEN SATURATION: 98 % | BODY MASS INDEX: 37.36 KG/M2 | DIASTOLIC BLOOD PRESSURE: 68 MMHG | SYSTOLIC BLOOD PRESSURE: 128 MMHG | TEMPERATURE: 98 F

## 2022-11-15 DIAGNOSIS — Z51.89 VISIT FOR WOUND CHECK: Primary | ICD-10-CM

## 2022-11-15 DIAGNOSIS — J06.9 VIRAL URI: ICD-10-CM

## 2022-11-15 LAB
CTP QC/QA: YES
HCV AB SERPL QL IA: NEGATIVE
HIV 1+2 AB+HIV1 P24 AG SERPL QL IA: NEGATIVE
SARS-COV-2 RDRP RESP QL NAA+PROBE: NEGATIVE

## 2022-11-15 PROCEDURE — 86803 HEPATITIS C AB TEST: CPT | Performed by: EMERGENCY MEDICINE

## 2022-11-15 PROCEDURE — 87389 HIV-1 AG W/HIV-1&-2 AB AG IA: CPT | Performed by: EMERGENCY MEDICINE

## 2022-11-15 PROCEDURE — 99284 EMERGENCY DEPT VISIT MOD MDM: CPT | Mod: 25

## 2022-11-15 PROCEDURE — 87635 SARS-COV-2 COVID-19 AMP PRB: CPT | Performed by: PHYSICIAN ASSISTANT

## 2022-11-15 RX ORDER — GUAIFENESIN AND DEXTROMETHORPHAN HYDROBROMIDE 1200; 60 MG/1; MG/1
1 TABLET, EXTENDED RELEASE ORAL 2 TIMES DAILY PRN
Qty: 30 TABLET | Refills: 0 | Status: SHIPPED | OUTPATIENT
Start: 2022-11-15 | End: 2022-11-25

## 2022-11-15 RX ORDER — CETIRIZINE HYDROCHLORIDE 10 MG/1
10 TABLET ORAL DAILY
Qty: 30 TABLET | Refills: 0 | Status: SHIPPED | OUTPATIENT
Start: 2022-11-15 | End: 2023-11-15

## 2022-11-15 NOTE — Clinical Note
"Veda Mcconnell" Naomi was seen and treated in our emergency department on 11/15/2022.  She may return to work on 11/17/2022.       If you have any questions or concerns, please don't hesitate to call.      AJIT Buitrago"

## 2022-11-19 NOTE — ED PROVIDER NOTES
"  Source of History:  Patient    Chief complaint:  Suture / Staple Removal (To L forehead)      HPI:  Veda Salgado is a 27 y.o. female presenting with  suture removal to left forehead.  Denies any complaints to this area.  States areas healed well.  Also reports some mild rhinorrhea, nasal congestion sore throat would like screening for COVID.  She does report direct exposure recently  This is the extent to the patients complaints today here in the emergency department.    ROS: As per HPI and below:  Constitutional: No fever.  No chills.  Eyes: No visual changes.   ENT:  Sore throat, nasal congestion  Urinary: No abnormal urination.  MSK:  No arthralgias  Integument: No rashes or lesions.    Review of patient's allergies indicates:  No Known Allergies    PMH:  As per HPI and below:  Past Medical History:   Diagnosis Date    Back pain     GERD (gastroesophageal reflux disease)     Obesity      Past Surgical History:   Procedure Laterality Date    LIVER BIOPSY         Social History     Tobacco Use    Smoking status: Every Day     Types: Cigars    Smokeless tobacco: Never    Tobacco comments:     1 black and mild daily    Substance Use Topics    Alcohol use: Yes     Comment: monthly    Drug use: Yes     Types: Marijuana     Comment: once monthly        Physical Exam:    /68 (BP Location: Left arm, Patient Position: Sitting)   Pulse 84   Temp 98.4 °F (36.9 °C) (Oral)   Resp 16   Ht 5' 2" (1.575 m)   Wt 92.1 kg (203 lb)   SpO2 98%   BMI 37.13 kg/m²   Nursing note and vital signs reviewed.  Constitutional: No acute distress.  Eyes: No conjunctival injection.  Extraocular muscles are intact.  ENT: Normal phonation.  Nose with mild edema.  Oropharynx clear.  Musculoskeletal:  Full range motion of all extremities  Skin:  Well-healed laceration to the left forehead.  Six sutures in place.  Nontender.  No induration.  No rashes seen.  Good turgor.  No abrasions.  No ecchymoses.  Psych: Appropriate, " conversant.        I decided to obtain the patient's medical records.    Results for orders placed or performed during the hospital encounter of 11/15/22   HIV 1/2 Ag/Ab (4th Gen)   Result Value Ref Range    HIV 1/2 Ag/Ab Negative Negative   Hepatitis C Antibody   Result Value Ref Range    Hepatitis C Ab Negative Negative   POCT COVID-19 Rapid Screening   Result Value Ref Range    POC Rapid COVID Negative Negative     Acceptable Yes      Imaging Results    None       Suture Removal    Date/Time: 11/15/2022 9:38 AM  Location procedure was performed: [facility] EMERGENCY DEPARTMENT  Performed by: AJIT Buitrago  Authorized by: Lakeisha Best MD   Pre-operative diagnosis: Suture removal  Post-operative diagnosis: Suture removal  Body area: head/neck  Location details: forehead  Wound Appearance: clean, well healed, nontender and no drainage  Sutures Removed: 6  Facility: sutures placed in this facility  Complications: No  Specimens: No  Implants: No  Patient tolerance: Patient tolerated the procedure well with no immediate complications        MDM:     Veda Salgado 27 y.o. presented to the ED with c/o need for wound check.  Also reports some mild URI symptoms. Physical exam reveals well-appearing in no distress.  Sutures removed with no complication.  Nose with mild edema.  Oropharynx with mild erythema.  Remaining exam unremarkable    DDX:  Sinusitis, viral URI, COVID, wound check healing versus delayed verses infected    ED management:  Sutures removed no complications.  Rapid COVID is negative.  Discussed symptomatic care.  Discussed continued home care for wound.    Impression/Plan: Patient informed of diagnosis  The primary encounter diagnosis was Visit for wound check. A diagnosis of Viral URI was also pertinent to this visit. Patient will follow up with Primary.  Patient cautioned on when to return to ED.  Pt. Understands and agrees with current treatment plan                    Diagnostic Impression:    1. Visit for wound check    2. Viral URI         ED Disposition Condition    Discharge Stable            ED Prescriptions       Medication Sig Dispense Start Date End Date Auth. Provider    cetirizine (ZYRTEC) 10 MG tablet Take 1 tablet (10 mg total) by mouth once daily. 30 tablet 11/15/2022 11/15/2023 AJIT Buitrago    dextromethorphan-guaiFENesin (MUCINEX DM) 60-1,200 mg per 12 hr tablet Take 1 tablet by mouth 2 (two) times daily as needed. 30 tablet 11/15/2022 11/25/2022 AJIT Buitrago          Follow-up Information       Follow up With Specialties Details Why Contact Info    Alayna Hodges MD Family Medicine Schedule an appointment as soon as possible for a visit   [id number] [place] [id number]  [phone]              Please pardon typos or dictation errors, as this note was transcribed using M*Modal fluency direct dictation software.      AJIT Buitrago  11/19/22 0949

## 2023-01-25 ENCOUNTER — PATIENT OUTREACH (OUTPATIENT)
Dept: EMERGENCY MEDICINE | Facility: OTHER | Age: 28
End: 2023-01-25
Payer: MEDICAID

## 2023-06-27 ENCOUNTER — PATIENT MESSAGE (OUTPATIENT)
Dept: RESEARCH | Facility: HOSPITAL | Age: 28
End: 2023-06-27
Payer: MEDICAID

## 2023-09-15 ENCOUNTER — OCCUPATIONAL HEALTH (OUTPATIENT)
Dept: URGENT CARE | Facility: CLINIC | Age: 28
End: 2023-09-15

## 2023-09-15 DIAGNOSIS — Z13.9 ENCOUNTER FOR SCREENING: Primary | ICD-10-CM

## 2023-09-15 PROCEDURE — 86580 POCT TB SKIN TEST: ICD-10-PCS | Mod: S$GLB,,, | Performed by: PHYSICIAN ASSISTANT

## 2023-09-15 PROCEDURE — 86580 TB INTRADERMAL TEST: CPT | Mod: S$GLB,,, | Performed by: PHYSICIAN ASSISTANT

## 2023-09-19 ENCOUNTER — OCCUPATIONAL HEALTH (OUTPATIENT)
Dept: URGENT CARE | Facility: CLINIC | Age: 28
End: 2023-09-19

## 2023-09-19 DIAGNOSIS — Z13.9 ENCOUNTER FOR SCREENING: Primary | ICD-10-CM

## 2023-09-19 PROCEDURE — 86580 TB INTRADERMAL TEST: CPT | Mod: S$GLB,,, | Performed by: PHYSICIAN ASSISTANT

## 2023-09-19 PROCEDURE — 86580 POCT TB SKIN TEST: ICD-10-PCS | Mod: S$GLB,,, | Performed by: PHYSICIAN ASSISTANT

## 2023-09-21 LAB
TB INDURATION - 48 HR READ: 0 MM
TB INDURATION - 72 HR READ: 0 MM
TB SKIN TEST - 48 HR READ: NEGATIVE
TB SKIN TEST - 72 HR READ: NEGATIVE